# Patient Record
Sex: FEMALE | Race: WHITE | NOT HISPANIC OR LATINO | URBAN - METROPOLITAN AREA
[De-identification: names, ages, dates, MRNs, and addresses within clinical notes are randomized per-mention and may not be internally consistent; named-entity substitution may affect disease eponyms.]

---

## 2017-07-31 ENCOUNTER — INPATIENT (INPATIENT)
Facility: HOSPITAL | Age: 82
LOS: 0 days | Discharge: ANOTHER HOSPITAL | DRG: 85 | End: 2017-08-01
Attending: HOSPITALIST | Admitting: HOSPITALIST
Payer: COMMERCIAL

## 2017-07-31 VITALS
HEIGHT: 62 IN | OXYGEN SATURATION: 99 % | WEIGHT: 139.99 LBS | DIASTOLIC BLOOD PRESSURE: 78 MMHG | HEART RATE: 88 BPM | SYSTOLIC BLOOD PRESSURE: 150 MMHG | TEMPERATURE: 99 F | RESPIRATION RATE: 16 BRPM

## 2017-07-31 DIAGNOSIS — S06.1X0A TRAUMATIC CEREBRAL EDEMA WITHOUT LOSS OF CONSCIOUSNESS, INITIAL ENCOUNTER: ICD-10-CM

## 2017-07-31 DIAGNOSIS — I60.9 NONTRAUMATIC SUBARACHNOID HEMORRHAGE, UNSPECIFIED: ICD-10-CM

## 2017-07-31 LAB
ALBUMIN SERPL ELPH-MCNC: 3.7 G/DL — SIGNIFICANT CHANGE UP (ref 3.5–5)
ALP SERPL-CCNC: 96 U/L — SIGNIFICANT CHANGE UP (ref 40–120)
ALT FLD-CCNC: 25 U/L DA — SIGNIFICANT CHANGE UP (ref 10–60)
ANION GAP SERPL CALC-SCNC: 9 MMOL/L — SIGNIFICANT CHANGE UP (ref 5–17)
APTT BLD: 26.2 SEC — LOW (ref 27.5–37.4)
AST SERPL-CCNC: 39 U/L — SIGNIFICANT CHANGE UP (ref 10–40)
BASOPHILS # BLD AUTO: 0 K/UL — SIGNIFICANT CHANGE UP (ref 0–0.2)
BASOPHILS NFR BLD AUTO: 0.2 % — SIGNIFICANT CHANGE UP (ref 0–2)
BILIRUB SERPL-MCNC: 0.7 MG/DL — SIGNIFICANT CHANGE UP (ref 0.2–1.2)
BUN SERPL-MCNC: 21 MG/DL — HIGH (ref 7–18)
CALCIUM SERPL-MCNC: 9.1 MG/DL — SIGNIFICANT CHANGE UP (ref 8.4–10.5)
CHLORIDE SERPL-SCNC: 104 MMOL/L — SIGNIFICANT CHANGE UP (ref 96–108)
CO2 SERPL-SCNC: 25 MMOL/L — SIGNIFICANT CHANGE UP (ref 22–31)
CREAT SERPL-MCNC: 0.69 MG/DL — SIGNIFICANT CHANGE UP (ref 0.5–1.3)
EOSINOPHIL # BLD AUTO: 0 K/UL — SIGNIFICANT CHANGE UP (ref 0–0.5)
EOSINOPHIL NFR BLD AUTO: 0.1 % — SIGNIFICANT CHANGE UP (ref 0–6)
GLUCOSE SERPL-MCNC: 134 MG/DL — HIGH (ref 70–99)
HCT VFR BLD CALC: 40.8 % — SIGNIFICANT CHANGE UP (ref 34.5–45)
HGB BLD-MCNC: 13.8 G/DL — SIGNIFICANT CHANGE UP (ref 11.5–15.5)
INR BLD: 1.04 RATIO — SIGNIFICANT CHANGE UP (ref 0.88–1.16)
LYMPHOCYTES # BLD AUTO: 0.8 K/UL — LOW (ref 1–3.3)
LYMPHOCYTES # BLD AUTO: 4.8 % — LOW (ref 13–44)
MCHC RBC-ENTMCNC: 33.8 PG — SIGNIFICANT CHANGE UP (ref 27–34)
MCHC RBC-ENTMCNC: 33.9 GM/DL — SIGNIFICANT CHANGE UP (ref 32–36)
MCV RBC AUTO: 99.8 FL — SIGNIFICANT CHANGE UP (ref 80–100)
MONOCYTES # BLD AUTO: 0.5 K/UL — SIGNIFICANT CHANGE UP (ref 0–0.9)
MONOCYTES NFR BLD AUTO: 3.2 % — SIGNIFICANT CHANGE UP (ref 2–14)
NEUTROPHILS # BLD AUTO: 15.3 K/UL — HIGH (ref 1.8–7.4)
NEUTROPHILS NFR BLD AUTO: 91.7 % — HIGH (ref 43–77)
PLATELET # BLD AUTO: 267 K/UL — SIGNIFICANT CHANGE UP (ref 150–400)
POTASSIUM SERPL-MCNC: 4.1 MMOL/L — SIGNIFICANT CHANGE UP (ref 3.5–5.3)
POTASSIUM SERPL-SCNC: 4.1 MMOL/L — SIGNIFICANT CHANGE UP (ref 3.5–5.3)
PROT SERPL-MCNC: 7.7 G/DL — SIGNIFICANT CHANGE UP (ref 6–8.3)
PROTHROM AB SERPL-ACNC: 11.3 SEC — SIGNIFICANT CHANGE UP (ref 9.8–12.7)
RBC # BLD: 4.09 M/UL — SIGNIFICANT CHANGE UP (ref 3.8–5.2)
RBC # FLD: 12 % — SIGNIFICANT CHANGE UP (ref 10.3–14.5)
SODIUM SERPL-SCNC: 138 MMOL/L — SIGNIFICANT CHANGE UP (ref 135–145)
WBC # BLD: 16.7 K/UL — HIGH (ref 3.8–10.5)
WBC # FLD AUTO: 16.7 K/UL — HIGH (ref 3.8–10.5)

## 2017-07-31 PROCEDURE — 71010: CPT | Mod: 26

## 2017-07-31 PROCEDURE — 72125 CT NECK SPINE W/O DYE: CPT | Mod: 26

## 2017-07-31 PROCEDURE — 70450 CT HEAD/BRAIN W/O DYE: CPT | Mod: 26

## 2017-07-31 PROCEDURE — 12001 RPR S/N/AX/GEN/TRNK 2.5CM/<: CPT

## 2017-07-31 PROCEDURE — 99291 CRITICAL CARE FIRST HOUR: CPT | Mod: 25

## 2017-07-31 RX ORDER — ONDANSETRON 8 MG/1
4 TABLET, FILM COATED ORAL ONCE
Qty: 0 | Refills: 0 | Status: COMPLETED | OUTPATIENT
Start: 2017-07-31 | End: 2017-07-31

## 2017-07-31 RX ADMIN — ONDANSETRON 4 MILLIGRAM(S): 8 TABLET, FILM COATED ORAL at 21:25

## 2017-07-31 NOTE — ED ADULT NURSE NOTE - ED STAT RN HANDOFF DETAILS
Endorsed to FREDDIE Chow, remains alert, responsive, resting in stretcher, moving all extremities. Awaiting inpatient bed. Endorsed to FREDDIE Castillo, remains alert, responsive, resting in stretcher, moving all extremities. Awaiting inpatient bed.

## 2017-07-31 NOTE — ED PROVIDER NOTE - OBJECTIVE STATEMENT
89 y/o F pt with a PMHx of HTN (on Lisinopril) and no significant PSHx presents to ED c/o laceration to the back of her head s/p fall x today. Pt states that she lost balance and fell backwards; pt hit the back of her head and noticed bleeding. Pt reports that she feels fine, but her building made her come to the ED for evaluation. Pt denies LOC, fever or any other complaints. Non-smoker. Allergies: Sulfacetamide Sodium.

## 2017-07-31 NOTE — ED PROVIDER NOTE - CRITICAL CARE PROVIDED
consultation with other physicians/interpretation of diagnostic studies/additional history taking/consult w/ pt's family directly relating to pts condition/direct patient care (not related to procedure)

## 2017-07-31 NOTE — ED PROVIDER NOTE - MEDICAL DECISION MAKING DETAILS
89 y/o F pt presents to ED with a linear laceration to the occiput s/p mechanical fall. Will order CT head to r/o intercranial hemorrhage versus vertical spine fracture. Will also clean wound, staple and instruct pt to f/u for staple removal. 89 y/o F pt presents to ED with a linear laceration to the occiput s/p mechanical fall. Will order CT head to r/o intercranial hemorrhage versus vertical spine fracture. Will also clean wound, staple and instruct pt to f/u for staple removal. D/W Neurosurg recommendations above, pt gcs 15, well appearing, no indication for transfer

## 2017-07-31 NOTE — ED ADULT NURSE NOTE - OBJECTIVE STATEMENT
Patient presents to  ED with laceration to back of head s/p fall x today in her laundry room. Patient denies loss of consciousness, headache, dizziness, nausea, vomiting. Breathing easy and unlabored, speaking in full sentences, no use of accessory muscles. Daughter at bedside. Laceration to back of head, bleeding controlled at this time. f

## 2017-08-01 ENCOUNTER — INPATIENT (INPATIENT)
Facility: HOSPITAL | Age: 82
LOS: 1 days | Discharge: HOME | DRG: 85 | End: 2017-08-03
Attending: NEUROLOGICAL SURGERY | Admitting: NEUROLOGICAL SURGERY
Payer: MEDICARE

## 2017-08-01 VITALS
DIASTOLIC BLOOD PRESSURE: 62 MMHG | OXYGEN SATURATION: 96 % | RESPIRATION RATE: 16 BRPM | SYSTOLIC BLOOD PRESSURE: 126 MMHG | HEART RATE: 50 BPM

## 2017-08-01 VITALS
RESPIRATION RATE: 16 BRPM | TEMPERATURE: 98 F | SYSTOLIC BLOOD PRESSURE: 118 MMHG | DIASTOLIC BLOOD PRESSURE: 55 MMHG | OXYGEN SATURATION: 97 % | HEART RATE: 57 BPM

## 2017-08-01 DIAGNOSIS — I60.9 NONTRAUMATIC SUBARACHNOID HEMORRHAGE, UNSPECIFIED: ICD-10-CM

## 2017-08-01 DIAGNOSIS — G93.6 CEREBRAL EDEMA: ICD-10-CM

## 2017-08-01 DIAGNOSIS — Z29.9 ENCOUNTER FOR PROPHYLACTIC MEASURES, UNSPECIFIED: ICD-10-CM

## 2017-08-01 DIAGNOSIS — I62.00 NONTRAUMATIC SUBDURAL HEMORRHAGE, UNSPECIFIED: ICD-10-CM

## 2017-08-01 DIAGNOSIS — I10 ESSENTIAL (PRIMARY) HYPERTENSION: ICD-10-CM
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
## 2017-08-01 LAB
24R-OH-CALCIDIOL SERPL-MCNC: 57.4 NG/ML — SIGNIFICANT CHANGE UP (ref 30–100)
ANION GAP SERPL CALC-SCNC: 10 MMOL/L — SIGNIFICANT CHANGE UP (ref 5–17)
APPEARANCE UR: CLEAR — SIGNIFICANT CHANGE UP
BILIRUB UR-MCNC: NEGATIVE — SIGNIFICANT CHANGE UP
BUN SERPL-MCNC: 20 MG/DL — HIGH (ref 7–18)
CALCIUM SERPL-MCNC: 8.8 MG/DL — SIGNIFICANT CHANGE UP (ref 8.4–10.5)
CHLORIDE SERPL-SCNC: 105 MMOL/L — SIGNIFICANT CHANGE UP (ref 96–108)
CHOLEST SERPL-MCNC: 183 MG/DL — SIGNIFICANT CHANGE UP (ref 10–199)
CO2 SERPL-SCNC: 25 MMOL/L — SIGNIFICANT CHANGE UP (ref 22–31)
COLOR SPEC: YELLOW — SIGNIFICANT CHANGE UP
CREAT SERPL-MCNC: 0.65 MG/DL — SIGNIFICANT CHANGE UP (ref 0.5–1.3)
DIFF PNL FLD: ABNORMAL
FOLATE SERPL-MCNC: >20 NG/ML — SIGNIFICANT CHANGE UP (ref 4.8–24.2)
GLUCOSE SERPL-MCNC: 132 MG/DL — HIGH (ref 70–99)
GLUCOSE UR QL: NEGATIVE — SIGNIFICANT CHANGE UP
HBA1C BLD-MCNC: 5.9 % — HIGH (ref 4–5.6)
HCT VFR BLD CALC: 37.2 % — SIGNIFICANT CHANGE UP (ref 34.5–45)
HDLC SERPL-MCNC: 65 MG/DL — SIGNIFICANT CHANGE UP (ref 40–125)
HGB BLD-MCNC: 12.9 G/DL — SIGNIFICANT CHANGE UP (ref 11.5–15.5)
KETONES UR-MCNC: ABNORMAL
LEUKOCYTE ESTERASE UR-ACNC: ABNORMAL
LIPID PNL WITH DIRECT LDL SERPL: 107 MG/DL — SIGNIFICANT CHANGE UP
MAGNESIUM SERPL-MCNC: 2 MG/DL — SIGNIFICANT CHANGE UP (ref 1.6–2.6)
MCHC RBC-ENTMCNC: 34.5 PG — HIGH (ref 27–34)
MCHC RBC-ENTMCNC: 34.8 GM/DL — SIGNIFICANT CHANGE UP (ref 32–36)
MCV RBC AUTO: 99.2 FL — SIGNIFICANT CHANGE UP (ref 80–100)
NITRITE UR-MCNC: NEGATIVE — SIGNIFICANT CHANGE UP
PH UR: 6.5 — SIGNIFICANT CHANGE UP (ref 5–8)
PHOSPHATE SERPL-MCNC: 3.3 MG/DL — SIGNIFICANT CHANGE UP (ref 2.5–4.5)
PLATELET # BLD AUTO: 241 K/UL — SIGNIFICANT CHANGE UP (ref 150–400)
POTASSIUM SERPL-MCNC: 4.3 MMOL/L — SIGNIFICANT CHANGE UP (ref 3.5–5.3)
POTASSIUM SERPL-SCNC: 4.3 MMOL/L — SIGNIFICANT CHANGE UP (ref 3.5–5.3)
PROT UR-MCNC: 15
RBC # BLD: 3.75 M/UL — LOW (ref 3.8–5.2)
RBC # FLD: 12 % — SIGNIFICANT CHANGE UP (ref 10.3–14.5)
SODIUM SERPL-SCNC: 140 MMOL/L — SIGNIFICANT CHANGE UP (ref 135–145)
SP GR SPEC: 1.02 — SIGNIFICANT CHANGE UP (ref 1.01–1.02)
TOTAL CHOLESTEROL/HDL RATIO MEASUREMENT: 2.8 RATIO — LOW (ref 3.3–7.1)
TRIGL SERPL-MCNC: 57 MG/DL — SIGNIFICANT CHANGE UP (ref 10–149)
TSH SERPL-MCNC: 0.38 UU/ML — SIGNIFICANT CHANGE UP (ref 0.34–4.82)
UROBILINOGEN FLD QL: NEGATIVE — SIGNIFICANT CHANGE UP
VIT B12 SERPL-MCNC: 622 PG/ML — SIGNIFICANT CHANGE UP (ref 243–894)
WBC # BLD: 11.9 K/UL — HIGH (ref 3.8–10.5)
WBC # FLD AUTO: 11.9 K/UL — HIGH (ref 3.8–10.5)

## 2017-08-01 PROCEDURE — 82607 VITAMIN B-12: CPT

## 2017-08-01 PROCEDURE — 80053 COMPREHEN METABOLIC PANEL: CPT

## 2017-08-01 PROCEDURE — 80048 BASIC METABOLIC PNL TOTAL CA: CPT

## 2017-08-01 PROCEDURE — 85730 THROMBOPLASTIN TIME PARTIAL: CPT

## 2017-08-01 PROCEDURE — 82553 CREATINE MB FRACTION: CPT

## 2017-08-01 PROCEDURE — 71045 X-RAY EXAM CHEST 1 VIEW: CPT

## 2017-08-01 PROCEDURE — 84484 ASSAY OF TROPONIN QUANT: CPT

## 2017-08-01 PROCEDURE — 86901 BLOOD TYPING SEROLOGIC RH(D): CPT

## 2017-08-01 PROCEDURE — 85027 COMPLETE CBC AUTOMATED: CPT

## 2017-08-01 PROCEDURE — 86900 BLOOD TYPING SEROLOGIC ABO: CPT

## 2017-08-01 PROCEDURE — 99285 EMERGENCY DEPT VISIT HI MDM: CPT | Mod: 25,GC

## 2017-08-01 PROCEDURE — 70450 CT HEAD/BRAIN W/O DYE: CPT | Mod: 26

## 2017-08-01 PROCEDURE — 93010 ELECTROCARDIOGRAM REPORT: CPT

## 2017-08-01 PROCEDURE — 83036 HEMOGLOBIN GLYCOSYLATED A1C: CPT

## 2017-08-01 PROCEDURE — 81001 URINALYSIS AUTO W/SCOPE: CPT

## 2017-08-01 PROCEDURE — 99233 SBSQ HOSP IP/OBS HIGH 50: CPT

## 2017-08-01 PROCEDURE — 73030 X-RAY EXAM OF SHOULDER: CPT

## 2017-08-01 PROCEDURE — 82550 ASSAY OF CK (CPK): CPT

## 2017-08-01 PROCEDURE — 87186 SC STD MICRODIL/AGAR DIL: CPT

## 2017-08-01 PROCEDURE — 99221 1ST HOSP IP/OBS SF/LOW 40: CPT

## 2017-08-01 PROCEDURE — 72125 CT NECK SPINE W/O DYE: CPT

## 2017-08-01 PROCEDURE — 70450 CT HEAD/BRAIN W/O DYE: CPT

## 2017-08-01 PROCEDURE — 70450 CT HEAD/BRAIN W/O DYE: CPT | Mod: 26,77

## 2017-08-01 PROCEDURE — 99223 1ST HOSP IP/OBS HIGH 75: CPT | Mod: AI,GC

## 2017-08-01 PROCEDURE — 87086 URINE CULTURE/COLONY COUNT: CPT

## 2017-08-01 PROCEDURE — 73030 X-RAY EXAM OF SHOULDER: CPT | Mod: 26,50

## 2017-08-01 PROCEDURE — 12001 RPR S/N/AX/GEN/TRNK 2.5CM/<: CPT

## 2017-08-01 PROCEDURE — 93005 ELECTROCARDIOGRAM TRACING: CPT

## 2017-08-01 PROCEDURE — 82306 VITAMIN D 25 HYDROXY: CPT

## 2017-08-01 PROCEDURE — 84100 ASSAY OF PHOSPHORUS: CPT

## 2017-08-01 PROCEDURE — 86850 RBC ANTIBODY SCREEN: CPT

## 2017-08-01 PROCEDURE — 84443 ASSAY THYROID STIM HORMONE: CPT

## 2017-08-01 PROCEDURE — 83735 ASSAY OF MAGNESIUM: CPT

## 2017-08-01 PROCEDURE — 80061 LIPID PANEL: CPT

## 2017-08-01 PROCEDURE — 99291 CRITICAL CARE FIRST HOUR: CPT | Mod: 25

## 2017-08-01 PROCEDURE — 85610 PROTHROMBIN TIME: CPT

## 2017-08-01 PROCEDURE — 82746 ASSAY OF FOLIC ACID SERUM: CPT

## 2017-08-01 RX ORDER — DEXAMETHASONE 0.5 MG/5ML
10 ELIXIR ORAL ONCE
Qty: 0 | Refills: 0 | Status: COMPLETED | OUTPATIENT
Start: 2017-08-01 | End: 2017-08-01

## 2017-08-01 RX ORDER — LISINOPRIL 2.5 MG/1
1 TABLET ORAL
Qty: 0 | Refills: 0 | COMMUNITY
Start: 2017-08-01

## 2017-08-01 RX ORDER — TETANUS TOXOID, REDUCED DIPHTHERIA TOXOID AND ACELLULAR PERTUSSIS VACCINE, ADSORBED 5; 2.5; 8; 8; 2.5 [IU]/.5ML; [IU]/.5ML; UG/.5ML; UG/.5ML; UG/.5ML
0.5 SUSPENSION INTRAMUSCULAR ONCE
Qty: 0 | Refills: 0 | Status: COMPLETED | OUTPATIENT
Start: 2017-08-01 | End: 2017-08-01

## 2017-08-01 RX ORDER — ACETAMINOPHEN 500 MG
2 TABLET ORAL
Qty: 0 | Refills: 0 | COMMUNITY
Start: 2017-08-01

## 2017-08-01 RX ORDER — ACETAMINOPHEN 500 MG
0 TABLET ORAL
Qty: 0 | Refills: 0 | COMMUNITY

## 2017-08-01 RX ORDER — ATORVASTATIN CALCIUM 80 MG/1
1 TABLET, FILM COATED ORAL
Qty: 0 | Refills: 0 | COMMUNITY
Start: 2017-08-01

## 2017-08-01 RX ORDER — ATORVASTATIN CALCIUM 80 MG/1
20 TABLET, FILM COATED ORAL AT BEDTIME
Qty: 0 | Refills: 0 | Status: DISCONTINUED | OUTPATIENT
Start: 2017-08-01 | End: 2017-08-01

## 2017-08-01 RX ORDER — LISINOPRIL 2.5 MG/1
10 TABLET ORAL DAILY
Qty: 0 | Refills: 0 | Status: DISCONTINUED | OUTPATIENT
Start: 2017-08-01 | End: 2017-08-01

## 2017-08-01 RX ORDER — DEXAMETHASONE 0.5 MG/5ML
0 ELIXIR ORAL
Qty: 0 | Refills: 0 | COMMUNITY
Start: 2017-08-01

## 2017-08-01 RX ORDER — ACETAMINOPHEN 500 MG
650 TABLET ORAL EVERY 6 HOURS
Qty: 0 | Refills: 0 | Status: DISCONTINUED | OUTPATIENT
Start: 2017-08-01 | End: 2017-08-01

## 2017-08-01 RX ORDER — DEXAMETHASONE 0.5 MG/5ML
10 ELIXIR ORAL ONCE
Qty: 0 | Refills: 0 | Status: DISCONTINUED | OUTPATIENT
Start: 2017-08-01 | End: 2017-08-01

## 2017-08-01 RX ORDER — LISINOPRIL 2.5 MG/1
1 TABLET ORAL
Qty: 0 | Refills: 0 | COMMUNITY

## 2017-08-01 RX ORDER — LEVETIRACETAM 250 MG/1
500 TABLET, FILM COATED ORAL
Qty: 0 | Refills: 0 | Status: DISCONTINUED | OUTPATIENT
Start: 2017-08-01 | End: 2017-08-02

## 2017-08-01 RX ORDER — SODIUM CHLORIDE 9 MG/ML
1000 INJECTION INTRAMUSCULAR; INTRAVENOUS; SUBCUTANEOUS
Qty: 0 | Refills: 0 | Status: DISCONTINUED | OUTPATIENT
Start: 2017-08-01 | End: 2017-08-02

## 2017-08-01 RX ADMIN — SODIUM CHLORIDE 100 MILLILITER(S): 9 INJECTION INTRAMUSCULAR; INTRAVENOUS; SUBCUTANEOUS at 16:01

## 2017-08-01 RX ADMIN — Medication 102 MILLIGRAM(S): at 11:49

## 2017-08-01 RX ADMIN — LEVETIRACETAM 500 MILLIGRAM(S): 250 TABLET, FILM COATED ORAL at 20:08

## 2017-08-01 RX ADMIN — TETANUS TOXOID, REDUCED DIPHTHERIA TOXOID AND ACELLULAR PERTUSSIS VACCINE, ADSORBED 0.5 MILLILITER(S): 5; 2.5; 8; 8; 2.5 SUSPENSION INTRAMUSCULAR at 14:38

## 2017-08-01 RX ADMIN — SODIUM CHLORIDE 100 MILLILITER(S): 9 INJECTION INTRAMUSCULAR; INTRAVENOUS; SUBCUTANEOUS at 20:08

## 2017-08-01 NOTE — ED PROVIDER NOTE - NS ED ROS FT
ROS: GENERAL: no fevers, no chills HEENT: no epistaxis, no eye pain, no ear pain, no throat pain CARDIAC: no chest pain, no shortness of breath PULM: no cough, no shortness of breath GI: no nausea, no vomiting, no diarrhea, no abdominal pain, no hematemesis, no bright red blood per rectum : no dysuria, no hematuria EXTREMITIES: no arm pain, no leg pain, no back pain SKIN: no purpura, no petechiae, + head laceration NEURO: no headache, no neck pain, no loss of strength/sensation HEME: no easy bruising, no easy bleeding

## 2017-08-01 NOTE — CONSULT NOTE ADULT - SUBJECTIVE AND OBJECTIVE BOX
CC:    HPI: HPI:  89 y/o F from home, lives alone, NO HHA, ambulates with the help of cane, PMH of arthritis, HTN, no significant PSHx presents to ED with f/o fall leading to head injury this afternoon. patient was at her baseline health and was on her way to laundry in her building she was coming down the ramp, when all of a sudden her legs gave away, she lost her balance, and fell on her head backwards. patient hit her head and noticed bleeding, then she woke up and went to [facility], doorman noticed that she was bleeding and called 911. patient denies any chest pain, dizziness, palpitations, nasuea, vomiting LOC before or after the event, remember entire event. patient had 1 episode of fall in the past. Patient denies any headache, vomiting, visual disturbances, weakness, numbness, tingling of extremities, difficulty swallowing or difficulty breathing, pain in extremities or restricted movement of nay joint, able to ambulate well just like before.  In ED, patient vitals were stable on admission, s/p 2 staples for laceration over occipital portion of scalp, CT head and cervical spine s/o Small acute bilateral frontal subdural hematomas and acute subarachnoid hemorrhages. No acute fracture cervical spine. ED physician discusssed with neurosurgery , since patient's GCS is 15, no indication for transfer, to get urgent CT head  if change in mental status, EKG_ NSR@71 BPM, right ventricular conduction delay, no ST T wave changes  will admit to telemetry for management of Small acute bilateral frontal subdural hematomas and acute subarachnoid hemorrhages, impending worsening of neuro function. (01 Aug 2017 01:35)      ROS:  Constitutional, Neurological, Psychiatric, Eyes, ENT, Cardiovascular, Respiratory, Gastrointestinal, Genitourinary, Musculoskeletal, Integumentary, Endocrine and Heme/Lymph are otherwise negative. Except for HPI    Vital Signs Last 24 Hrs  T(C): 36.8 (01 Aug 2017 07:40), Max: 37.1 (31 Jul 2017 16:53)  T(F): 98.2 (01 Aug 2017 07:40), Max: 98.8 (31 Jul 2017 23:23)  HR: 88 (01 Aug 2017 07:40) (64 - 88)  BP: 105/70 (01 Aug 2017 07:40) (105/70 - 150/78)  BP(mean): --  RR: 17 (01 Aug 2017 07:40) (16 - 17)  SpO2: 98% (01 Aug 2017 07:40) (98% - 99%)    Physical Exam:  Constitutional: alert and in no acute distress.  Eyes: the sclera and conjunctiva were normal, pupils were equal in size, round, reactive to light, with normal accommodation and extraocular movements were intact.   Back: no costovertebral angle tenderness and no spinal tenderness.      Neuro Exam: (pending)  Orientation: oriented to person, oriented to place and oriented to time.   Attention: normal concentrating ability and visual attention was not decreased.   Language: no difficulty naming common objects, no difficulty repeating a phrase, no difficulty writing a sentence, fluency intact, comprehension intact and reading intact.   Fund of knowledge: displays adequate knowledge of personal past history.   Cranial Nerves: visual acuity intact bilaterally, visual fields full to confrontation, pupils equal round and reactive to light, extraocular motion intact, facial sensation intact symmetrically, face symmetrical, hearing was intact bilaterally, tongue and palate midline, head turning and shoulder shrug symmetric and there was no tongue deviation with protrusion.   Motor: muscle tone was normal in all four extremities, muscle strength was normal in all four extremities and normal bulk in all four extremities.   Sensory exam: light touch was intact.   Coordination:. normal gait. balance was intact. there was no past-pointing. no tremor present.   Deep tendon reflexes:   Biceps right 2+. Biceps left 2+.    Triceps right 2+. Triceps left 2+.  LOC  Brachioradialis right 2+. Brachioradialis left 2+.    Patella right 2+. Patella left 2+.    Ankle jerk right 2+. Ankle jerk left 2+.   Plantar responses normal on the right, normal on the left.        Allergies    Sulfacetamide Sodium (Unknown)    Intolerances      MEDICATIONS  (STANDING):  lisinopril 10 milliGRAM(s) Oral daily  atorvastatin 20 milliGRAM(s) Oral at bedtime    MEDICATIONS  (PRN):  acetaminophen   Tablet. 650 milliGRAM(s) Oral every 6 hours PRN Severe Pain (7 - 10)      LABS:                        12.9   11.9  )-----------( 241      ( 01 Aug 2017 05:38 )             37.2     08-01    140  |  105  |  20<H>  ----------------------------<  132<H>  4.3   |  25  |  0.65    Ca    8.8      01 Aug 2017 05:38  Phos  3.3     08-01  Mg     2.0     08-01    TPro  7.7  /  Alb  3.7  /  TBili  0.7  /  DBili  x   /  AST  39  /  ALT  25  /  AlkPhos  96  07-31    PT/INR - ( 31 Jul 2017 20:40 )   PT: 11.3 sec;   INR: 1.04 ratio         PTT - ( 31 Jul 2017 20:40 )  PTT:26.2 sec        Neuro Imaging: reviewed CC:    HPI: HPI:  89 y/o F from home, lives alone, NO HHA, ambulates with the help of cane, PMH of arthritis, HTN, no significant PSHx presents to ED with f/o fall leading to head injury this afternoon. patient was at her baseline health and was on her way to laundry in her building she was coming down the ramp, when all of a sudden her legs gave away, she lost her balance, and fell on her head backwards. patient hit her head and noticed bleeding, then she woke up and went to [facility], doorman noticed that she was bleeding and called 911. patient denies any chest pain, dizziness, palpitations, nasuea, vomiting LOC before or after the event, remember entire event. patient had 1 episode of fall in the past. Patient denies any headache, vomiting, visual disturbances, weakness, numbness, tingling of extremities, difficulty swallowing or difficulty breathing, pain in extremities or restricted movement of nay joint, able to ambulate well just like before.  In ED, patient vitals were stable on admission, s/p 2 staples for laceration over occipital portion of scalp, CT head and cervical spine s/o Small acute bilateral frontal subdural hematomas and acute subarachnoid hemorrhages. No acute fracture cervical spine. ED physician discusssed with neurosurgery , since patient's GCS is 15, no indication for transfer, to get urgent CT head  if change in mental status, EKG_ NSR@71 BPM, right ventricular conduction delay, no ST T wave changes  will admit to telemetry for management of Small acute bilateral frontal subdural hematomas and acute subarachnoid hemorrhages, impending worsening of neuro function. (01 Aug 2017 01:35)      ROS:  Constitutional, Neurological, Psychiatric, Eyes, ENT, Cardiovascular, Respiratory, Gastrointestinal, Genitourinary, Musculoskeletal, Integumentary, Endocrine and Heme/Lymph are otherwise negative. Except for HPI    Vital Signs Last 24 Hrs  T(C): 36.8 (01 Aug 2017 07:40), Max: 37.1 (31 Jul 2017 16:53)  T(F): 98.2 (01 Aug 2017 07:40), Max: 98.8 (31 Jul 2017 23:23)  HR: 88 (01 Aug 2017 07:40) (64 - 88)  BP: 105/70 (01 Aug 2017 07:40) (105/70 - 150/78)  BP(mean): --  RR: 17 (01 Aug 2017 07:40) (16 - 17)  SpO2: 98% (01 Aug 2017 07:40) (98% - 99%)    Physical Exam:  Constitutional: alert and in no acute distress.  Eyes: the sclera and conjunctiva were normal, pupils were equal in size, round, reactive to light, with normal accommodation and extraocular movements were intact.   Back: no costovertebral angle tenderness and no spinal tenderness.      Neuro Exam:  deferred    Allergies    Sulfacetamide Sodium (Unknown)    Intolerances      MEDICATIONS  (STANDING):  lisinopril 10 milliGRAM(s) Oral daily  atorvastatin 20 milliGRAM(s) Oral at bedtime    MEDICATIONS  (PRN):  acetaminophen   Tablet. 650 milliGRAM(s) Oral every 6 hours PRN Severe Pain (7 - 10)      LABS:                        12.9   11.9  )-----------( 241      ( 01 Aug 2017 05:38 )             37.2     08-01    140  |  105  |  20<H>  ----------------------------<  132<H>  4.3   |  25  |  0.65    Ca    8.8      01 Aug 2017 05:38  Phos  3.3     08-01  Mg     2.0     08-01    TPro  7.7  /  Alb  3.7  /  TBili  0.7  /  DBili  x   /  AST  39  /  ALT  25  /  AlkPhos  96  07-31    PT/INR - ( 31 Jul 2017 20:40 )   PT: 11.3 sec;   INR: 1.04 ratio         PTT - ( 31 Jul 2017 20:40 )  PTT:26.2 sec        Neuro Imaging: reviewed

## 2017-08-01 NOTE — H&P ADULT - ASSESSMENT
Yolanda Castellanos, 89 year old female with history of HTN transferred here from [facility] after suffering a mechanical fall with relatively stable tSAH/SDH, increased IPH.    -admit to neurosurgery  -repeat cth pending  -[id number]  -keppra 500 bid

## 2017-08-01 NOTE — DISCHARGE NOTE ADULT - PATIENT PORTAL LINK FT
“You can access the [facility] Patient Portal, offered by [facility], by registering with the following website: [email or web address]”

## 2017-08-01 NOTE — H&P ADULT - HISTORY OF PRESENT ILLNESS
89 y/o F from home, lives alone, NO HHA, ambulates with the help of cane, PMH of arthritis, HTN, no significant PSHx presents to ED with f/o fall leading to head injury this afternoon. patient was at her baseline health and was on her way to laundry in her building she was coming down the ramp, when all of a sudden her legs gave away, she lost her balance, and fell on her head backwards. patient hit her head and noticed bleeding, then she woke up and went to [facility], isl noticed that she was bleeding and called 911. patient denies any chest pain, dizziness, palpitations, nasuea, vomiting LOC before or after the event, remember entire event. patient had 1 episode of fall in the past. Patient denies any headache, vomiting, visual disturbances, weakness, numbness, tingling of extremities, difficulty swallowing or difficulty breathing, pain in extremities or restricted movement of nay joint, able to ambulate well just like before.  In ED, patient vitals were stable on admission, s/p 2 staples for laceration over occipital portion of scalp, CT head and cervical spine s/o Small acute bilateral frontal subdural hematomas and acute subarachnoid hemorrhages. No acute fracture cervical spine. ED physician discusssed with neurosurgery , since patient's GCS is 15, no indication for transfer, to get urgent CT head  if change in mental status.  will admit to telemetry for management of Small acute bilateral frontal subdural hematomas and acute subarachnoid hemorrhages, impending worsening of neuro function. 89 y/o F from home, lives alone, NO HHA, ambulates with the help of cane, PMH of arthritis, HTN, no significant PSHx presents to ED with f/o fall leading to head injury this afternoon. patient was at her baseline health and was on her way to laundry in her building she was coming down the ramp, when all of a sudden her legs gave away, she lost her balance, and fell on her head backwards. patient hit her head and noticed bleeding, then she woke up and went to [facility], sil noticed that she was bleeding and called 911. patient denies any chest pain, dizziness, palpitations, nasuea, vomiting LOC before or after the event, remember entire event. patient had 1 episode of fall in the past. Patient denies any headache, vomiting, visual disturbances, weakness, numbness, tingling of extremities, difficulty swallowing or difficulty breathing, pain in extremities or restricted movement of nay joint, able to ambulate well just like before.  In ED, patient vitals were stable on admission, s/p 2 staples for laceration over occipital portion of scalp, CT head and cervical spine s/o Small acute bilateral frontal subdural hematomas and acute subarachnoid hemorrhages. No acute fracture cervical spine. ED physician discusssed with neurosurgery , since patient's GCS is 15, no indication for transfer, to get urgent CT head  if change in mental status, EKG_ NSR@71 BPM, right ventricular conduction delay, no ST T wave changes  will admit to telemetry for management of Small acute bilateral frontal subdural hematomas and acute subarachnoid hemorrhages, impending worsening of neuro function.

## 2017-08-01 NOTE — H&P ADULT - NEUROLOGICAL DETAILS
deep reflexes intact/normal strength/sensation intact/superficial reflexes intact/cranial nerves intact/alert and oriented x 3

## 2017-08-01 NOTE — H&P ADULT - RS GEN PE MLT RESP DETAILS PC
clear to auscultation bilaterally/breath sounds equal/respirations non-labored/no chest wall tenderness/airway patent/good air movement

## 2017-08-01 NOTE — H&P ADULT - ATTENDING COMMENTS
Pt seen and examined with resident.  As per above H&P.  Pt s/p fall backward with minimal symptoms at this time.  She is neurologically non focal, fully conversant.  HARRIS.  CT showed bifrontal contusions with flourishing of the left frontal contusion on repeat and increase in posterior interhemispheric subdural blood.  Repeat CT after transfer from [facility], by verbal report from radiologist, was stable.  Pt to be admitted for further observation, PT assessment.

## 2017-08-01 NOTE — DISCHARGE NOTE ADULT - PLAN OF CARE
patient will remain symptom free -TRANSFER TO [facility] for neurosurgery evaluation  -maintain BP less than 140/90  -maintain aspiration precautions  -neuro checks per hospital protocol -continue lisinopril maintain fall precautions IMPROVE score 2  -no chemical DVT prophylaxis due to SAH maintain fall precautions    continue statin

## 2017-08-01 NOTE — CONSULT NOTE ADULT - PROBLEM SELECTOR RECOMMENDATION 9
Pt has SAH and SDH sec to Mechanical Fall leading to Head trauma seen on CT head last night showing Small acute bilateral frontal subdural hematomas and acute subarachnoid hemorrhages.   Repeat CT head this AM showed Mild bifrontal acute subarachnoid hemorrhages, grossly unchanged. Mild bifrontal subdural hemorrhages, slightly increased on the right and improved on the left. New mild acute subdural hemorrhages in the bilateral parietal and occipital regions. New left frontal intraparenchymal hemorrhage measuring approximately 3.2 x 3.4 cm with adjacent parenchymal edema.  No acute fracture cervical spine  patient's GCS is 15.  no focal neurological deficit noted.  will get CT head stat in case of neural deficits or change in mental status.  s/p Decadron 10 mg IV once for cerebral edema  neuro check sQ2h  fall precautions  Neuro Dr. Khanna consulted, plan for Transfer to [facility] [phone], discussed with Attending who initiated Transfer but awaiting bed.  Discussed case with pts PMD Pt has SAH and SDH sec to Mechanical Fall leading to Head trauma seen on CT head last night showing Small acute bilateral frontal subdural hematomas and acute subarachnoid hemorrhages.   Repeat CT head this AM showed Mild bifrontal acute subarachnoid hemorrhages, grossly unchanged. Mild bifrontal subdural hemorrhages, slightly increased on the right and improved on the left. New mild acute subdural hemorrhages in the bilateral parietal and occipital regions. New left frontal intraparenchymal hemorrhage measuring approximately 3.2 x 3.4 cm with adjacent parenchymal edema.  No acute fracture cervical spine  patient's GCS is 15.  no focal neurological deficit noted.  will get CT head stat in case of neural deficits or change in mental status.  s/p Decadron 10 mg IV once for cerebral edema  neuro check sQ2h  fall precautions  Neuro Dr. Khanna consulted, plan for Transfer to [facility] [phone] ER, discussed with Attending who initiated Transfer.   Discussed case with pts PMD  Discussed plan with Intensivist

## 2017-08-01 NOTE — DISCHARGE NOTE ADULT - MEDICATION SUMMARY - MEDICATIONS TO TAKE
I will START or STAY ON the medications listed below when I get home from the hospital:    dexamethasone  --     -- Indication: For Subarachnoid bleed    acetaminophen 325 mg oral tablet  -- 2 tab(s) by mouth every 6 hours, As needed, Severe Pain (7 - 10)  -- Indication: For Prophylactic measure    lisinopril 10 mg oral tablet  -- 1 tab(s) by mouth once a day  -- Indication: For HTN (hypertension)    atorvastatin 20 mg oral tablet  -- 1 tab(s) by mouth once a day (at bedtime)  -- Indication: For Prophylactic measure    multivitamin  --     -- Indication: For Prophylactic measure

## 2017-08-01 NOTE — ED ADULT NURSE NOTE - OBJECTIVE STATEMENT
89 y/o F, reported to ED via transfer from [place]. Pt A&Ox3, c/o subarachnoid bleed. Pt was in the laundry room at her apartment building when she fell backwards and hit her head on the wall. Pt has a laceration to the back of her head that was treated with 2 staples. Pt is unsure if she lost consciousness. Pt denies LOC, H/A, C/P, N/V/D, abd pain. Pt denies vision changes. Neuro intact. 89 y/o F, reported to ED via transfer from [place]. Pt A&Ox3, c/o subarachnoid bleed. Pt was in the laundry room at her apartment building when she fell backwards and hit her head on the wall. Pt has a laceration to the back of her head that was treated with 2 staples. Pt is unsure if she lost consciousness. Pt has a 20G in LAC from previous hospital. Pt denies pain or discomfort at this time. Pt denies LOC, H/A, C/P, N/V/D, abd pain. Pt denies vision changes. Neuro intact. PMS intact. Pt denies numbness or tingling. Will continue to monitor pt, daughters at pt's bedside.

## 2017-08-01 NOTE — CONSULT NOTE ADULT - ASSESSMENT
Posttraumatic subdural hematoma, subarachnoid hemorrhage, now with interval expansion to intracerebral left frontal hemorrhage with cerebral edema  maintain blood pressure less than 140/90  Neuro checks  Transferred to medical ICU  Discuss code status   Call Transfer center [phone] Neurosurgery to consider transfer to [facility]  MRI brain with and without contrast Posttraumatic subdural hematoma, subarachnoid hemorrhage, now with interval expansion to intracerebral left frontal hemorrhage with cerebral edema  maintain blood pressure less than 140/90  Neuro checks  Transfer to medical ICU  Discuss code status   Call Transfer center [phone] Neurosurgery to consider transfer to [facility]  MRI brain with and without contrast

## 2017-08-01 NOTE — H&P ADULT - NSHPLABSRESULTS_GEN_ALL_CORE
< from: CT Cervical Spine No Cont (07.31.17 @ 17:44) >    CT CERVICAL SPINE    , CT Head      < end of copied text >    < from: CT Cervical Spine No Cont (07.31.17 @ 17:44) >    IMPRESSION:  Small acute bilateral frontal subdural hematomas and acute subarachnoid   hemorrhages.    No acute fracture cervical spine.    < end of copied text >    _________________________________________________  LABS:                        13.8   16.7  )-----------( 267      ( 31 Jul 2017 20:40 )             40.8     07-31    138  |  104  |  21<H>  ----------------------------<  134<H>  4.1   |  25  |  0.69    Ca    9.1      31 Jul 2017 20:40    TPro  7.7  /  Alb  3.7  /  TBili  0.7  /  DBili  x   /  AST  39  /  ALT  25  /  AlkPhos  96  07-31    PT/INR - ( 31 Jul 2017 20:40 )   PT: 11.3 sec;   INR: 1.04 ratio         PTT - ( 31 Jul 2017 20:40 )  PTT:26.2 sec

## 2017-08-01 NOTE — H&P ADULT - ASSESSMENT
89 y/o F from home, lives alone, NO HHA, ambulates with the help of cane, PMH of arthritis, HTN, no significant PSHx presents to ED with f/o fall leading to head injury this afternoon.    In ED, patient vitals were stable on admission, s/p 2 staples for laceration over occipital portion of scalp, CT head and cervical spine s/o Small acute bilateral frontal subdural hematomas and acute subarachnoid hemorrhages. No acute fracture cervical spine. ED physician discussed with neurosurgery , since patient's GCS is 15, no indication for transfer, to get urgent CT head  if change in mental status.  will admit to telemetry for management of Small acute bilateral frontal subdural hematomas and acute subarachnoid hemorrhages, impending worsening of neuro function.

## 2017-08-01 NOTE — CONSULT NOTE ADULT - SUBJECTIVE AND OBJECTIVE BOX
p ([id number])     HPI: Yolanda Castellanos, 89 year old female with history of HTN [facility] suffering a [facility] fall yesterday late afternoon. Denies any LOC. Patient only on lisinopril, no AC.  Patient ambulates with a walker and goes grocery shopping on her own.  Received 2 sets of CT scans before transfer. 16 hour interval [facility] showed stable mild bifrontal tsah/SDH, new mild SDH  in bilateral parietal and occipital regions, and new left frontal IPH (3.2x3.4).    PAST MEDICAL HISTORY   Hypertension    PAST SURGICAL HISTORY     MEDICATIONS:  Antibiotics:     Neuro:    Anticoagulation: neg    Other:  sodium chloride 0.9%. 1000 milliLiter(s) IV Continuous <Continuous>      SOCIAL HISTORY:   Occupation:   Marital Status:     FAMILY HISTORY:      REVIEW OF SYSTEMS:  Check here if all are normal other than Neurological [x]  General:  Eyes:  ENT:  Cardiac:  Respiratory:  GI:  Musculoskeletal:   Skin:  Neurologic:   Psychiatric:     PHYSICAL EXAMINATION:   T(C): 36.8 (08-01-17 @ 13:43), Max: 36.8 (08-01-17 @ 13:43)  HR: 57 (08-01-17 @ 13:43) (50 - 57)  BP: 108/96 (08-01-17 @ 13:43) (108/96 - 126/62)  RR: 18 (08-01-17 @ 13:43) (16 - 18)  SpO2: 96% (08-01-17 @ 13:43) (96% - 96%)  Wt(kg): --    General Examination:     Neurologic Examination:             Higher functions                 Normal [x]               Abnormal:      Cranial Nerves (ii-xii)           Normal [x]              Abnormal:     Motor Exam                       Normal []              Abnormal: 4+/5 bilateral LE (baseline), otherwise 5/5 throughout                              Sensory Exam                   Normal [x]              Abnormal:    Reflexes                            Normal [x]              Abnormal:     Coordination                      Normal [x]              Abnormal:    Other:     LABS:                RADIOLOGY & ADDITIONAL STUDIES:  per hpi

## 2017-08-01 NOTE — ED PROVIDER NOTE - MEDICAL DECISION MAKING DETAILS
89 y/o F from home, lives alone, NO HHA, ambulates with the help of cane, PMH of arthritis, HTN, no significant PSHx transferred from [facility] to here for Posttraumatic subdural hematoma, subarachnoid hemorrhage yesterday afternoon s/p fall, now with interval expansion to intracerebral left frontal hemorrhage with cerebral edema this morning. Labs WNL from this morning, will get repeat CT head, Nsgy and trauma consult, patient otherwise asymptomatic, admit for further monitoring.

## 2017-08-01 NOTE — ED PROVIDER NOTE - OBJECTIVE STATEMENT
89 y/o F from home, lives alone, NO HHA, ambulates with the help of cane, [place] of arthritis, HTN, no significant PSHx transferred from [facility] to here for Posttraumatic subdural hematoma, subarachnoid hemorrhage, now with interval expansion to intracerebral left frontal hemorrhage with cerebral edema this morning. Yesterday afternoon, had mechanical fall with patient was at her baseline health and was on her way to laundry in her building she was coming down the ramp, when all of a sudden her legs gave away, she lost her balance, and fell on her head backwards. Was convinced to go to ED at [facility] for evaluation. Patient denied any chest pain, dizziness, palpitations, nausea, vomiting LOC before or after the event, remember entire event. patient had 1 episode of fall in the past. Patient denies any headache, vomiting, visual disturbances, weakness, numbness, tingling of extremities, difficulty swallowing or difficulty breathing, pain in extremities or restricted movement of nay joint, able to ambulate well just like before. Observed overnight Given decadron at [facility].    Posttraumatic subdural hematoma, subarachnoid hemorrhage, now with interval expansion to intracerebral left frontal hemorrhage with cerebral edema  maintain blood pressure less than 140/90  Neuro checks  Transferred to medical ICU  Discuss code status   Call Transfer center [phone] Neurosurgery to consider transfer to [facility]  MRI brain with and without contrast 89 y/o F from home, lives alone, NO HHA, ambulates with the help of cane, [facility] of arthritis, HTN, no significant PSHx transferred from [facility] to here for Posttraumatic subdural hematoma, subarachnoid hemorrhage yesterday s/p fall, now with interval expansion to intracerebral left frontal hemorrhage with cerebral edema this morning. Patient asymptomatic at this time, only with laceration on the back of the head s/p two staples. Yesterday afternoon, had mechanical fall with patient was at her baseline health and was on her way to laundry in her building she was coming down the ramp, she lost her balance, and fell on her head backwards. Was convinced from building to go to ED at [facility] for evaluation. No LOC, remembers entire event. Patient had 1 episode of fall in the past without consequence, unknown tetanus vaccine. Denies lightheadedness, fevers, chills, nausea, vomiting, diarrhea, constipation, chest pain, abd, or dyspnea. No motor or sensory changes, no bowel/bladder symptoms. Observed overnight, had interval worsening hemorrhages from initial evaluation. Given 10mg decadron at [facility]. As per neurology recommendations there, maintain blood pressure less than 140/90  Neuro checks  Transferred to medical ICU  Discuss code status   Call Transfer center [phone] Neurosurgery to consider transfer to [facility]  MRI brain with and without contrast 89 y/o F from home, lives alone, NO HHA, ambulates with the help of cane, PMH of arthritis, HTN, no significant PSHx transferred from [facility] to here for Posttraumatic subdural hematoma, subarachnoid hemorrhage yesterday s/p fall, now with interval expansion to intracerebral left frontal hemorrhage with cerebral edema this morning. Patient asymptomatic at this time, only with laceration on the back of the head s/p two staples. Yesterday afternoon, had mechanical fall with patient was at her baseline health and was on her way to laundry in her building she was coming down the ramp, she lost her balance, and fell on her head backwards. Was convinced from building to go to ED at [facility] for evaluation. No LOC, remembers entire event. Patient had 1 episode of fall in the past without consequence, unknown tetanus vaccine. Denies lightheadedness, fevers, chills, nausea, vomiting, diarrhea, constipation, chest pain, abd, or dyspnea. No motor or sensory changes, no bowel/bladder symptoms. Observed overnight, had interval worsening hemorrhages with cerebral edema from initial evaluation. Given 10mg decadron at [facility]. As per neurology recommendations there, maintain blood pressure less than 140/90, c/w Neuro checks, transferred here for neurosurg eval, possible MRI brain with and without contrast.    PCP: Dr. Jon Willis ([phone]) 89 y/o F from home, lives alone, NO HHA, ambulates with the help of cane, PMH of arthritis, HTN, no significant PSHx transferred from [facility] to here for Posttraumatic subdural hematoma, subarachnoid hemorrhage yesterday afternoon s/p fall, now with interval expansion to intracerebral left frontal hemorrhage with cerebral edema this morning. Patient asymptomatic at this time, only with laceration on the back of the head s/p two staples. Yesterday afternoon, had mechanical fall with patient was at her baseline health and was on her way to laundry in her building she was coming down the ramp, she lost her balance, and fell on her head backwards. Was convinced from building to go to ED at [facility] for evaluation. No LOC, remembers entire event. Patient had 1 episode of fall in the past without consequence, unknown tetanus vaccine. Denies lightheadedness, fevers, chills, nausea, vomiting, diarrhea, constipation, chest pain, abd, or dyspnea. No motor or sensory changes, no bowel/bladder symptoms. Observed overnight, had interval worsening hemorrhages with cerebral edema from initial evaluation. Given 10mg decadron at [facility]. As per neurology recommendations there, maintain blood pressure less than 140/90, c/w Neuro checks, transferred here for neurosurg eval, possible MRI brain with and without contrast.    PCP: Dr. Jon Willis ([phone])

## 2017-08-01 NOTE — CONSULT NOTE ADULT - ASSESSMENT
Yolanda Castellanos, 89 year old female with history of HTN transferred here from [facility] after suffering a mechanical fall with relatively stable tSAH/SDH, increased IPH. Yolanda Castellanos, 89 year old female with history of HTN transferred here from [facility] after suffering a mechanical fall with relatively stable tSAH/SDH, increased IPH.    -repeat cth, [id number] if stable  -keppra 500 bid  -trauma eval

## 2017-08-01 NOTE — CONSULT NOTE ADULT - ATTENDING COMMENTS
89 Female with HTN admitted for SAH and SDH due to Head trauma from Mechanical Fall.    Problem/Recommendation - 1:  Problem: Subarachnoid bleed. Recommendation: Pt has SAH and SDH sec to Mechanical Fall leading to Head trauma seen on CT head last night showing Small acute bilateral frontal subdural hematomas and acute subarachnoid hemorrhages.   Repeat CT head this AM showed Mild bifrontal acute subarachnoid hemorrhages, grossly unchanged. Mild bifrontal subdural hemorrhages, slightly increased on the right and improved on the left. New mild acute subdural hemorrhages in the bilateral parietal and occipital regions. New left frontal intraparenchymal hemorrhage measuring approximately 3.2 x 3.4 cm with adjacent parenchymal edema.  No acute fracture cervical spine  patient's GCS is 15.  no focal neurological deficit noted.  will get CT head stat in case of neural deficits or change in mental status.  s/p Decadron 10 mg IV once for cerebral edema  neuro check sQ2h  fall precautions  Neuro Dr. Khanna consulted, plan for Transfer to [facility] [phone] ER, discussed with Attending who initiated Transfer.   Discussed case with pts PMD  Discussed plan with Intensivist.    Problem/Recommendation - 2:  ·  Problem: Subdural hemorrhage.  Recommendation: same plan as above.     Problem/Recommendation - 3:  ·  Problem: Essential hypertension.  Recommendation: c/w lisinopril 10 mg home med  keep BP <140/90 due to SDH and SAH.  Monitor BP.     Problem/Recommendation - 4:  ·  Problem: Prophylactic measure.  Recommendation: SCD for DVT ppx no chemical AC due to SAH and SDH.     -no anticoagulation  -neurochecks Q1 hr  -transfer

## 2017-08-01 NOTE — ED PROVIDER NOTE - ATTENDING CONTRIBUTION TO CARE
------------ATTENDING NOTE------------   89 yo F w/ family transferred for traumatic ich after mechanical fall 2 days ago, HD stable, at baseline mental status per daughters, no antiplatelets or anticoagulants, evaluated by NSGY on arrival, awaiting Trauma Service evaluation, repeat CT, awaiting admission disposition.  - Jared Fuller MD   ---------------------------------------------------------------------------

## 2017-08-01 NOTE — CONSULT NOTE ADULT - SUBJECTIVE AND OBJECTIVE BOX
Patient is a 89y old  Female who presents with a chief complaint of fall leading to head injury today (01 Aug 2017 01:35)      Initial HPI on admission:  HPI:  89 y/o F from home, lives alone, NO HHA, ambulates with the help of cane, PMH of arthritis, HTN, no significant PSHx presents to ED with f/o fall leading to head injury this afternoon. patient was at her baseline health and was on her way to laundry in her building she was coming down the ramp, when all of a sudden her legs gave away, she lost her balance, and fell on her head backwards. patient hit her head and noticed bleeding, then she woke up and went to lobby, doorman noticed that she was bleeding and called 911. patient denies any chest pain, dizziness, palpitations, nasuea, vomiting LOC before or after the event, remember entire event. patient had 1 episode of fall in the past. Patient denies any headache, vomiting, visual disturbances, weakness, numbness, tingling of extremities, difficulty swallowing or difficulty breathing, pain in extremities or restricted movement of nay joint, able to ambulate well just like before.  In ED, patient vitals were stable on admission, s/p 2 staples for laceration over occipital portion of scalp, CT head and cervical spine s/o Small acute bilateral frontal subdural hematomas and acute subarachnoid hemorrhages. No acute fracture cervical spine. ED physician discusssed with neurosurgery , since patient's GCS is 15, no indication for transfer, to get urgent CT head  if change in mental status, EKG_ NSR@71 BPM, right ventricular conduction delay, no ST T wave changes  will admit to telemetry for management of Small acute bilateral frontal subdural hematomas and acute subarachnoid hemorrhages, impending worsening of neuro function. (01 Aug 2017 01:35)      BRIEF HOSPITAL COURSE: ***    Review of Systems:  CONSTITUTIONAL: No fever, chills, or fatigue  EYES: No eye pain, visual disturbances, or discharge  ENMT:  No difficulty hearing, tinnitus, vertigo; No sinus or throat pain  NECK: No pain or stiffness  RESPIRATORY: No cough, wheezing, chills or hemoptysis; No shortness of breath  CARDIOVASCULAR: No chest pain, palpitations, dizziness, or leg swelling  GASTROINTESTINAL: No abdominal or epigastric pain. No nausea, vomiting, or hematemesis; No diarrhea or constipation. No melena or hematochezia.  GENITOURINARY: No dysuria, frequency, hematuria, or incontinence  NEUROLOGICAL: No headaches, memory loss, loss of strength, numbness, or tremors  SKIN: No itching, burning, rashes, or lesions   MUSCULOSKELETAL: No joint pain or swelling; No muscle, back, or extremity pain  PSYCHIATRIC: No depression, anxiety, mood swings, or difficulty sleeping    PAST MEDICAL & SURGICAL HISTORY:  Arthritis  HTN (hypertension)  No significant past surgical history    Allergies    Sulfacetamide Sodium (Unknown)    Intolerances      FAMILY HISTORY:  No pertinent family history in first degree relatives      Medications:  lisinopril 10 milliGRAM(s) Oral daily  atorvastatin 20 milliGRAM(s) Oral at bedtime  acetaminophen   Tablet. 650 milliGRAM(s) Oral every 6 hours PRN  dexamethasone  Injectable 10 milliGRAM(s) IV Push once      O2 supplementation/vent settings      Vital Signs Last 24 Hrs  T(C): 36.8 (01 Aug 2017 07:40), Max: 37.1 (2017 16:53)  T(F): 98.2 (01 Aug 2017 07:40), Max: 98.8 (2017 23:23)  HR: 88 (01 Aug 2017 07:40) (64 - 88)  BP: 105/70 (01 Aug 2017 07:40) (105/70 - 150/78)  BP(mean): --  RR: 17 (01 Aug 2017 07:40) (16 - 17)  SpO2: 98% (01 Aug 2017 07:40) (98% - 99%)              LABS:                        12.9   11.9  )-----------( 241      ( 01 Aug 2017 05:38 )             37.2     08-    140  |  105  |  20<H>  ----------------------------<  132<H>  4.3   |  25  |  0.65    Ca    8.8      01 Aug 2017 05:38  Phos  3.3     08-  Mg     2.0     08-    TPro  7.7  /  Alb  3.7  /  TBili  0.7  /  DBili  x   /  AST  39  /  ALT  25  /  AlkPhos  96  07-31      CARDIAC MARKERS ( 01 Aug 2017 07:39 )  0.036 ng/mL / x     / 75 U/L / x     / 2.2 ng/mL      CAPILLARY BLOOD GLUCOSE        PT/INR - ( 2017 20:40 )   PT: 11.3 sec;   INR: 1.04 ratio         PTT - ( 2017 20:40 )  PTT:26.2 sec  Urinalysis Basic - ( 01 Aug 2017 02:11 )    Color: Yellow / Appearance: Clear / S.020 / pH: x  Gluc: x / Ketone: Moderate  / Bili: Negative / Urobili: Negative   Blood: x / Protein: 15 / Nitrite: Negative   Leuk Esterase: Trace / RBC: 2-5 /HPF / WBC 3-5 /HPF   Sq Epi: x / Non Sq Epi: Few / Bacteria: Moderate /HPF      CULTURES:        Physical Examination:    General: No acute distress.      HEENT: Pupils equal, reactive to light.  Symmetric.    PULM: Clear to auscultation bilaterally, no significant sputum production    CVS: Regular rate and rhythm, no murmurs, rubs, or gallops    ABD: Soft, nondistended, nontender, normoactive bowel sounds, no masses    EXT: No edema, nontender    SKIN: Warm and well perfused, no rashes noted.    NEURO: Alert, oriented X3, interactive, non focal, Motor strenghth equal in all 4 extremities      RADIOLOGY:  CT Head No Cont (17 @ 10:02) > Mild bifrontal acute subarachnoid hemorrhages, grossly unchanged. Mild bifrontal subdural hemorrhages, slightly increased on the right and improved on the left. New mild acute subdural hemorrhages in the bilateral parietal and occipital regions. New left frontal intraparenchymal hemorrhage measuring approximately 3.2 x 3.4 cm with adjacent parenchymal edema.    EKG Sinus rhythm with RSR pattern @ 71 bpm Patient is a 89y old  Female who presents with a chief complaint of fall leading to head injury today (01 Aug 2017 01:35)      Initial HPI on admission:  HPI:  89 y/o F from home, lives alone, NO HHA, ambulates with the help of cane, PMH of arthritis, HTN, no significant PSHx presents to ED with f/o fall leading to head injury this afternoon. patient was at her baseline health and was on her way to laundry in her building she was coming down the ramp, when all of a sudden her legs gave away, she lost her balance, and fell on her head backwards. patient hit her head and noticed bleeding, then she woke up and went to lobby, doorman noticed that she was bleeding and called 911. patient denies any chest pain, dizziness, palpitations, nasuea, vomiting LOC before or after the event, remember entire event. patient had 1 episode of fall in the past. Patient denies any headache, vomiting, visual disturbances, weakness, numbness, tingling of extremities, difficulty swallowing or difficulty breathing, pain in extremities or restricted movement of nay joint, able to ambulate well just like before.  In ED, patient vitals were stable on admission, s/p 2 staples for laceration over occipital portion of scalp, CT head and cervical spine s/o Small acute bilateral frontal subdural hematomas and acute subarachnoid hemorrhages. No acute fracture cervical spine. ED physician discusssed with neurosurgery , since patient's GCS is 15, no indication for transfer, to get urgent CT head  if change in mental status, EKG_ NSR@71 BPM, right ventricular conduction delay, no ST T wave changes  will admit to telemetry for management of Small acute bilateral frontal subdural hematomas and acute subarachnoid hemorrhages, impending worsening of neuro function. (01 Aug 2017 01:35) Currently she is AAO x3      BRIEF HOSPITAL COURSE: ***    Review of Systems:  CONSTITUTIONAL: No fever, chills, or fatigue  EYES: No eye pain, visual disturbances, or discharge  ENMT:  No difficulty hearing, tinnitus, vertigo; No sinus or throat pain  NECK: No pain or stiffness  RESPIRATORY: No cough, wheezing, chills or hemoptysis; No shortness of breath  CARDIOVASCULAR: No chest pain, palpitations, dizziness, or leg swelling  GASTROINTESTINAL: No abdominal or epigastric pain. No nausea, vomiting, or hematemesis; No diarrhea or constipation. No melena or hematochezia.  GENITOURINARY: No dysuria, frequency, hematuria, or incontinence  NEUROLOGICAL: No headaches, memory loss, loss of strength, numbness, or tremors  SKIN: No itching, burning, rashes, or lesions   MUSCULOSKELETAL: No joint pain or swelling; No muscle, back, or extremity pain  PSYCHIATRIC: No depression, anxiety, mood swings, or difficulty sleeping    PAST MEDICAL & SURGICAL HISTORY:  Arthritis  HTN (hypertension)  No significant past surgical history    Allergies    Sulfacetamide Sodium (Unknown)    Intolerances      FAMILY HISTORY:  No pertinent family history in first degree relatives      Medications:  lisinopril 10 milliGRAM(s) Oral daily  atorvastatin 20 milliGRAM(s) Oral at bedtime  acetaminophen   Tablet. 650 milliGRAM(s) Oral every 6 hours PRN  dexamethasone  Injectable 10 milliGRAM(s) IV Push once      O2 supplementation/vent settings      Vital Signs Last 24 Hrs  T(C): 36.8 (01 Aug 2017 07:40), Max: 37.1 (2017 16:53)  T(F): 98.2 (01 Aug 2017 07:40), Max: 98.8 (2017 23:23)  HR: 88 (01 Aug 2017 07:40) (64 - 88)  BP: 105/70 (01 Aug 2017 07:40) (105/70 - 150/78)  BP(mean): --  RR: 17 (01 Aug 2017 07:40) (16 - 17)  SpO2: 98% (01 Aug 2017 07:40) (98% - 99%)              LABS:                        12.9   11.9  )-----------( 241      ( 01 Aug 2017 05:38 )             37.2     08    140  |  105  |  20<H>  ----------------------------<  132<H>  4.3   |  25  |  0.65    Ca    8.8      01 Aug 2017 05:38  Phos  3.3     08-  Mg     2.0     08    TPro  7.7  /  Alb  3.7  /  TBili  0.7  /  DBili  x   /  AST  39  /  ALT  25  /  AlkPhos  96  07      CARDIAC MARKERS ( 01 Aug 2017 07:39 )  0.036 ng/mL / x     / 75 U/L / x     / 2.2 ng/mL      CAPILLARY BLOOD GLUCOSE        PT/INR - ( 2017 20:40 )   PT: 11.3 sec;   INR: 1.04 ratio         PTT - ( 2017 20:40 )  PTT:26.2 sec  Urinalysis Basic - ( 01 Aug 2017 02:11 )    Color: Yellow / Appearance: Clear / S.020 / pH: x  Gluc: x / Ketone: Moderate  / Bili: Negative / Urobili: Negative   Blood: x / Protein: 15 / Nitrite: Negative   Leuk Esterase: Trace / RBC: 2-5 /HPF / WBC 3-5 /HPF   Sq Epi: x / Non Sq Epi: Few / Bacteria: Moderate /HPF      CULTURES:        Physical Examination:    General: No acute distress.      HEENT: Pupils equal, reactive to light.  Symmetric.    PULM: Clear to auscultation bilaterally, no significant sputum production    CVS: Regular rate and rhythm, no murmurs, rubs, or gallops    ABD: Soft, nondistended, nontender, normoactive bowel sounds, no masses    EXT: No edema, nontender    SKIN: Warm and well perfused, no rashes noted.    NEURO: Alert, oriented X3, interactive, non focal, Motor strenghth equal in all 4 extremities      RADIOLOGY:  CT Head No Cont (17 @ 10:02) > Mild bifrontal acute subarachnoid hemorrhages, grossly unchanged. Mild bifrontal subdural hemorrhages, slightly increased on the right and improved on the left. New mild acute subdural hemorrhages in the bilateral parietal and occipital regions. New left frontal intraparenchymal hemorrhage measuring approximately 3.2 x 3.4 cm with adjacent parenchymal edema.    EKG Sinus rhythm with RSR pattern @ 71 bpm

## 2017-08-01 NOTE — H&P ADULT - PROBLEM SELECTOR PLAN 1
likely mechanical.  will admit to telemetry to r/o arrythmias even though patient do not c/o any symptoms  before or after the fall.   CT head and cervical spine s/o Small acute bilateral frontal subdural hematomas and acute subarachnoid hemorrhages. No acute fracture cervical spine  will get X ray pelvis and X ray of both shoulders to r/o any fractures.  fall precautions  pain management. likely mechanical.  will admit to telemetry to r/o arrhythmias even though patient do not c/o any symptoms  before or after the fall.   CT head and cervical spine s/o Small acute bilateral frontal subdural hematomas and acute subarachnoid hemorrhages. No acute fracture cervical spine  will get X ray pelvis and X ray of both shoulders to r/o any fractures.  fall precautions  pain management.  EKG-   orthostatics-   will get cardiac enzymes likely mechanical.  will admit to telemetry to r/o arrhythmias even though patient do not c/o any symptoms  before or after the fall.   CT head and cervical spine s/o Small acute bilateral frontal subdural hematomas and acute subarachnoid hemorrhages. No acute fracture cervical spine  will get X ray pelvis and X ray of both shoulders to r/o any fractures.  fall precautions  pain management.  EKG- NSR@71 BPM, right ventricular conduction delay, no ST T wave changes  orthostatics- negative  will get cardiac enzymes likely mechanical.  will admit to telemetry to r/o arrhythmias even though patient do not c/o any symptoms  before or after the fall.   CT head and cervical spine s/o Small acute bilateral frontal subdural hematomas and acute subarachnoid hemorrhages. No acute fracture cervical spine  will get X ray pelvis and X ray of both shoulders to r/o any fractures.  fall precautions  pain management.  EKG- NSR@71 BPM, right ventricular conduction delay, no ST T wave changes  orthostatics- negative  will get cardiac enzymes.  neurology- DR. Khanna consulted.  will get CT head in AM and at 6pm.

## 2017-08-01 NOTE — H&P ADULT - HISTORY OF PRESENT ILLNESS
p ([id number])     HPI: Yolanda Castellanos, 89 year old female with history of HTN transferred here from [facility] after suffering a mechanical fall yesterday late afternoon. Denies any LOC. Patient only on lisinopril, no AC.  Patient ambulates with a walker and goes grocery shopping on her own.  Received 2 sets of CT scans before transfer. 16 hour interval scan from [facility] showed stable mild bifrontal tsah/SDH, new mild SDH  in bilateral parietal and occipital regions, and new left frontal IPH (3.2x3.4).    PAST MEDICAL HISTORY   Hypertension    PAST SURGICAL HISTORY         MEDICATIONS:  Antibiotics:    Neuro:    Anticoagulation:    Other:  sodium chloride 0.9%. 1000 milliLiter(s) IV Continuous <Continuous>      SOCIAL HISTORY:   Occupation:   Marital Status:     FAMILY HISTORY:      REVIEW OF SYSTEMS:  Check here if all are normal other than Neurological [x]  General:  Eyes:  ENT:  Cardiac:  Respiratory:  GI:  Musculoskeletal:   Skin:  Neurologic:   Psychiatric:     PHYSICAL EXAMINATION:   T(C): 36.8 (08-01-17 @ 13:43), Max: 36.8 (08-01-17 @ 13:43)  HR: 57 (08-01-17 @ 13:43) (50 - 57)  BP: 108/96 (08-01-17 @ 13:43) (108/96 - 126/62)  RR: 18 (08-01-17 @ 13:43) (16 - 18)  SpO2: 96% (08-01-17 @ 13:43) (96% - 96%)  Wt(kg): --    General Examination:     Neurologic Examination:             Higher functions                 Normal [x]               Abnormal:      Cranial Nerves (ii-xii)           Normal [x]              Abnormal:     Motor Exam                       Normal [x]              Abnormal:                               Sensory Exam                   Normal [x]              Abnormal:    Reflexes                            Normal [x]              Abnormal:     Coordination                      Normal [x]              Abnormal:    Other:     LABS:                RADIOLOGY & ADDITIONAL STUDIES:    CTH: pending

## 2017-08-01 NOTE — DISCHARGE NOTE ADULT - HOSPITAL COURSE
89 y/o F from home, lives alone, NO HHA, ambulates with the help of cane, PMH of arthritis, HTN, no significant PSHx presents to ED with f/o fall leading to head injury yesterday afternoon. patient was at her baseline health and was on her way to laundry in her building she was coming down the ramp, when all of a sudden her legs gave away, she lost her balance, and fell on her head backwards. patient hit her head and noticed bleeding, then she woke up and went to [facility], sil noticed that she was bleeding and called 911. patient denies any chest pain, dizziness, palpitations, nasuea, vomiting LOC before or after the event, remember entire event. patient had 1 episode of fall in the past. Patient denies any headache, vomiting, visual disturbances, weakness, numbness, tingling of extremities, difficulty swallowing or difficulty breathing, pain in extremities or restricted movement of nay joint, able to ambulate well just like before.    In ED, patient vitals were stable on admission, s/p 2 staples for laceration over occipital portion of scalp, CT head and cervical spine s/o Small acute bilateral frontal subdural hematomas and acute subarachnoid hemorrhages. No acute fracture cervical spine. ED physician discussed with neurosurgery   EKG_ NSR@71 BPM, right ventricular conduction delay, no ST T wave changes    Given 1 dose dexamethasone 10mg x1 IVPB      < from: CT Head No Cont (08.01.17 @ 10:02) >  Impression: Mild bifrontal acute subarachnoid hemorrhages, grossly   unchanged. Mild bifrontal subdural hemorrhages, slightly increased on the   right and improved on the left. New mild acute subdural hemorrhages in   the bilateral parietal and occipital regions. New left frontal   intraparenchymal hemorrhage measuring approximately 3.2 x 3.4 cm with   adjacent parenchymal edema.    patient to be transferred for further management.

## 2017-08-01 NOTE — CONSULT NOTE ADULT - SUBJECTIVE AND OBJECTIVE BOX
TRAUMA SERVICE (Acute Care Surgery / ACS - #[id number]) - CONSULT NOTE  --------------------------------------------------------------------------------------------    TRAUMA ACTIVATION LEVEL:   3  MECHANISM OF INJURY:      [] Blunt  	[] MVC	[X] Fall	[] Pedestrian Struck	[] Motorcycle accident      [] Penetrating  	[] Gun Shot Wound 		[] Stab Wound    GCS: 	E: 4	V: 5	M: 6    Patient is a 89y old  Female who presents with a chief complaint of mechanical fall    HPI: Pt with PMH significant only for HTN BIB EMS to [facility] after suffering mechanical fall at home (no LOC). Initial scan at [facility] on 7/31 demonstrated acute SAH, and small b/l frontal SDH with no fracture. Subsequent imaging on 8/1 demonstrated unchanged SAH, slight increase in SDH and new mild SDH in b/l parietal and occipital regions as well as left frontal intraparenchymal hemorrhage with edema.  The patient was transferred to [facility] ED for further management    Primary Survey:   A - airway intact  B - bilateral breath sounds and good chest rise  C - initial BP  BP: 108/96 (08-01-17 @ 13:43)HR: 57 (08-01-17 @ 13:43), palpable pulses in all extremities  D - GCS 15 on arrival  Exposure obtained      Secondary Survey:  General: NAD  Neuro: CN II-XII intact, motor strength intact b/l UE and LE; sensation grossly intact to light touch b/l UE/LE  HEENT: Normocephalic, scalp lac to right occiput with staples, no active hemorrhage, dried blood in hair, no palpable step-offs, EOMI, PEERLA.  Neck: Soft, midline trachea.  Chest: No chest wall tenderness.   Cardiac: S1, S2, RRR  Respiratory: Bilateral breath sounds, clear and equal bilaterally  Abdomen: Soft, non-distended, non-tender, no rebound, no guarding, no masses palpated  Ext: palp radial b/l UE, b/l DP palp in Lower Extrem, motor and sensory grossly intact in all 4 extremities  Back: no TTP, no palpable runoff/stepoff/deformity, no ecchymoses      Patient denies fevers/chills, denies lightheadedness/dizziness, denies SOB/chest pain, denies nausea/vomiting, denies constipation/diarrhea.     ROS: 10-system review is otherwise negative except HPI above.      PAST MEDICAL & SURGICAL HISTORY:  Hypertension    FAMILY HISTORY:    [X] Family history not pertinent as reviewed with the patient and family    SOCIAL HISTORY:  Lives alone in an apartment without an aide    ALLERGIES: sulfonamides (Unknown)      HOME MEDICATIONS:  Lisinopril 10mg    --------------------------------------------------------------------------------------------    Vitals:   T(C): 36.8 (08-01-17 @ 13:43), Max: 36.8 (08-01-17 @ 13:43)  HR: 57 (08-01-17 @ 13:43) (50 - 57)  BP: 108/96 (08-01-17 @ 13:43) (108/96 - 126/62)  BP(mean): --  RR: 18 (08-01-17 @ 13:43) (16 - 18)  SpO2: 96% (08-01-17 @ 13:43) (96% - 96%)  Wt(kg): --      IMAGING  See [facility] for prior imaging. After transfer; scan obtained at [facility] demonstrates no changes since scan obtained 8/1 AM at [facility]    --------------------------------------------------------------------------------------------

## 2017-08-01 NOTE — CONSULT NOTE ADULT - ASSESSMENT
ASSESSMENT: Patient is a 89y old f s/p mechanical fall with SAH, SDH which increased 7/31 - 8/1, but has been unchanged from 9:55 AM to 4:30 PM. Patient has no other injuries.    PLAN:  - Patient is stable on exam with no other injuries and no focal neurological deficits  - Patient to be admitted to neurosurgery overnight for observation  - Plan to be discussed with Attending, Dr. Aleks Pruitt PGY2

## 2017-08-01 NOTE — ED ADULT NURSE REASSESSMENT NOTE - NS ED NURSE REASSESS COMMENT FT1
Patient resting, remains alert, responsive, resting in stretcher, moving all extremities. Awaiting inpatient bed.
Pt refuse CT scan NP Chichi aware
received Pt on stretcher alert and verbally responsive NAD attached to teleC transferred to [facility], pt care endorsed to Cesilia FRAZIER

## 2017-08-01 NOTE — H&P ADULT - NSHPREVIEWOFSYSTEMS_GEN_ALL_CORE
ROS negative  for fever, SOB, chest pain, cough, rash, headache, dizziness, diaphoresis, palpitations, abdominal pain, weakness, numbness, tingling of extremities, diarrhea, constipation, urinary disturbances.

## 2017-08-01 NOTE — DISCHARGE NOTE ADULT - CARE PLAN
Principal Discharge DX:	Subdural hemorrhage  Goal:	patient will remain symptom free  Instructions for follow-up, activity and diet:	-TRANSFER TO [facility] for neurosurgery evaluation  -maintain BP less than 140/90  -maintain aspiration precautions  -neuro checks per hospital protocol  Secondary Diagnosis:	Arthritis  Secondary Diagnosis:	HTN (hypertension)  Instructions for follow-up, activity and diet:	-continue lisinopril  Secondary Diagnosis:	Prophylactic measure  Instructions for follow-up, activity and diet:	IMPROVE score 2  -no chemical DVT prophylaxis due to SAH  Secondary Diagnosis:	Fall  Instructions for follow-up, activity and diet:	maintain fall precautions Principal Discharge DX:	Subdural hemorrhage  Goal:	patient will remain symptom free  Instructions for follow-up, activity and diet:	-TRANSFER TO [facility] for neurosurgery evaluation  -maintain BP less than 140/90  -maintain aspiration precautions  -neuro checks per hospital protocol  Secondary Diagnosis:	HTN (hypertension)  Instructions for follow-up, activity and diet:	-continue lisinopril  Secondary Diagnosis:	Prophylactic measure  Instructions for follow-up, activity and diet:	IMPROVE score 2  -no chemical DVT prophylaxis due to SAH  Secondary Diagnosis:	Fall  Instructions for follow-up, activity and diet:	maintain fall precautions    continue statin Principal Discharge DX:	Subdural hemorrhage  Goal:	patient will remain symptom free  Instructions for follow-up, activity and diet:	-TRANSFER TO [facility] for neurosurgery evaluation  -maintain BP less than 140/90  -maintain aspiration precautions  -neuro checks per hospital protocol  Secondary Diagnosis:	HTN (hypertension)  Instructions for follow-up, activity and diet:	-continue lisinopril  Secondary Diagnosis:	Prophylactic measure  Instructions for follow-up, activity and diet:	IMPROVE score 2  -no chemical DVT prophylaxis due to SAH  Secondary Diagnosis:	Fall  Instructions for follow-up, activity and diet:	maintain fall precautions    continue statin Principal Discharge DX:	Subdural hemorrhage  Goal:	patient will remain symptom free  Instructions for follow-up, activity and diet:	-TRANSFER TO [facility] for neurosurgery evaluation  -maintain BP less than 140/90  -maintain aspiration precautions  -neuro checks per hospital protocol  Secondary Diagnosis:	HTN (hypertension)  Instructions for follow-up, activity and diet:	-continue lisinopril  Secondary Diagnosis:	Prophylactic measure  Instructions for follow-up, activity and diet:	IMPROVE score 2  -no chemical DVT prophylaxis due to SAH  Secondary Diagnosis:	Fall  Instructions for follow-up, activity and diet:	maintain fall precautions    continue statin

## 2017-08-01 NOTE — ED PROVIDER NOTE - PHYSICAL EXAMINATION
PE: CONSTITUTIONAL: Well appearing, well nourished, in no apparent distress. ENMT: Airway patent, nasal mucosa clear, mouth with normal mucosa. HEAD: NCAT EYES: PERRL, EOMI bilaterally CARDIAC: RRR, no m/r/g, no pedal edema RESPIRATORY: CTA bilaterally, no adventitious sounds GI: Abdomen non-distended, non-tender MSK: Spine appears normal, range of motion is not limited, no muscle/joint tenderness NEURO: CNII-XII grossly intact, 5/5 strength, full sensation all extremities, gait intact SKIN: Skin tone normal in color, warm and dry. No evidence of rash. + 2cm stapledx2 laceration on the occiput, healing.

## 2017-08-01 NOTE — ED PROVIDER NOTE - PROGRESS NOTE DETAILS
Patient asymptomatic, CT Head stable from 10am this morning, will admit to the floor as discussed with neurosurgery.  - Eric Moreno MD

## 2017-08-01 NOTE — ED PROVIDER NOTE - CARE PLAN
Principal Discharge DX:	Cerebral edema  Secondary Diagnosis:	SDH (subdural hematoma)  Secondary Diagnosis:	SAH (subarachnoid hemorrhage)

## 2017-08-01 NOTE — H&P ADULT - PROBLEM SELECTOR PLAN 2
CT head and cervical spine s/o Small acute bilateral frontal subdural hematomas and acute subarachnoid hemorrhages. No acute fracture cervical spine  patient's GCS is 15.  no focal neurological deficit noted.  will get CT head in AM and PRN stat in case of neural deficits.  ED physician discussed with neurosurgery , since patient's GCS is 15, no indication for transfer, to get urgent CT head  if change in mental status.  neuro check sQ2h  fall precautions  will keep NPO overnight, impending worsening of neural function.

## 2017-08-02 PROCEDURE — 99231 SBSQ HOSP IP/OBS SF/LOW 25: CPT

## 2017-08-02 RX ORDER — DOCUSATE SODIUM 100 MG
100 CAPSULE ORAL THREE TIMES A DAY
Qty: 0 | Refills: 0 | Status: DISCONTINUED | OUTPATIENT
Start: 2017-08-02 | End: 2017-08-03

## 2017-08-02 RX ORDER — LISINOPRIL 2.5 MG/1
10 TABLET ORAL DAILY
Qty: 0 | Refills: 0 | Status: DISCONTINUED | OUTPATIENT
Start: 2017-08-02 | End: 2017-08-03

## 2017-08-02 RX ORDER — LEVETIRACETAM 250 MG/1
250 TABLET, FILM COATED ORAL
Qty: 0 | Refills: 0 | Status: DISCONTINUED | OUTPATIENT
Start: 2017-08-02 | End: 2017-08-03

## 2017-08-02 RX ORDER — SENNA PLUS 8.6 MG/1
2 TABLET ORAL AT BEDTIME
Qty: 0 | Refills: 0 | Status: DISCONTINUED | OUTPATIENT
Start: 2017-08-02 | End: 2017-08-03

## 2017-08-02 RX ADMIN — Medication 100 MILLIGRAM(S): at 17:59

## 2017-08-02 RX ADMIN — LISINOPRIL 10 MILLIGRAM(S): 2.5 TABLET ORAL at 08:17

## 2017-08-02 RX ADMIN — SENNA PLUS 2 TABLET(S): 8.6 TABLET ORAL at 21:16

## 2017-08-02 RX ADMIN — LEVETIRACETAM 500 MILLIGRAM(S): 250 TABLET, FILM COATED ORAL at 06:59

## 2017-08-02 RX ADMIN — Medication 100 MILLIGRAM(S): at 21:16

## 2017-08-02 RX ADMIN — LEVETIRACETAM 250 MILLIGRAM(S): 250 TABLET, FILM COATED ORAL at 17:59

## 2017-08-02 NOTE — CHART NOTE - NSCHARTNOTEFT_GEN_A_CORE
Attending attestation note to the H&P (unable to be edited as patient discharged).  Patient was seen and examined at bedside yesterday  Repeat CT head showed multiple scattered new hemorrhages. Case was discussed at length with neurosurgery team in NSU through transport center and agreed to transfer patient to their service.  Physical exam  General: patient is alert awake oriented, daughter at bedside,   HEENT: wnl, bilateral pupils are equal, round and reactive to light  Heart: s1, S2 normal  Chest: clear bilateral  Abdomen: NT ND  Neurological: patient able to move all 4 extremities against gravity, denies any sensory complaints,  tongue midline, bilateral NL folds equal,   ROM of the eyes intact  A/P:  patient to be transferred to [facility]

## 2017-08-02 NOTE — PROGRESS NOTE ADULT - ASSESSMENT
89 year old female with history of HTN transferred here from [facility] after suffering a mechanical fall yesterday late afternoon. Denies any LOC. Patient only on lisinopril, no AC.  Patient ambulates with a walker and goes grocery shopping on her own.  Received 2 sets of CT scans before transfer. 16 hour interval scan from [facility] showed stable mild bifrontal tsah/SDH, new mild SDH  in bilateral parietal and occipital regions, and new left frontal IPH (3.2x3.4). stable since yesterday                       PLAN:  Neuro: neuro checks q 4 hrs, cont keppra for seizure prop  Respiratory: no active issues  CV: resume lisinopril   Endocrine: no active issuesd  Heme/Onc:             DVT ppx: will discuss with Dr. Bales  Renal:   ID:   GI:   Social/Family:   Discharge planning: awaiting PT eval to ensure safe discharge 89 year old female with history of HTN transferred here from [facility] after suffering a mechanical fall yesterday late afternoon. Denies any LOC. Patient only on lisinopril, no AC.  Patient ambulates with a walker and goes grocery shopping on her own.  Received 2 sets of CT scans before transfer. 16 hour interval scan from [facility] showed stable mild bifrontal tsah/SDH, new mild SDH  in bilateral parietal and occipital regions, and new left frontal IPH (3.2x3.4). stable since yesterday                       PLAN:  Neuro: neuro checks q 4 hrs, cont keppra for seizure prop  Respiratory: no active issues  CV: resume lisinopril   Endocrine: no active issues  Heme/Onc:             DVT ppx: will discuss with Dr. Bales  Renal: creatine stable  ID: afebrile  GI: start diet, no need for GI prophylaxis   Social/Family:   Discharge planning: awaiting PT eval to ensure safe discharge

## 2017-08-02 NOTE — PROGRESS NOTE ADULT - SUBJECTIVE AND OBJECTIVE BOX
SUBJECTIVE: No complaints      Vital Signs Last 24 Hrs  T(C): 36.4 (17 @ 07:04), Max: 37.1 (17 @ 19:10)  T(F): 97.6 (17 @ 07:04), Max: 98.8 (17 @ 19:10)  HR: 46 (17 @ 07:04) (46 - 72)  BP: 127/79 (17 @ 07:04) (107/59 - 128/68)  BP(mean): --  RR: 18 (17 @ 07:04) (16 - 20)  SpO2: 95% (17 @ 07:04) (95% - 100%)    PHYSICAL EXAM:    Constitutional: No Acute Distress     Neurological: AOx3, Following Commands, Moving all Extremities             Motor exam: intact                                                          Sensation: [x ] intact to light touch     Pulmonary: Clear to Auscultation, No rales, No rhonchi, No wheezes     Cardiovascular: S1, S2, Regular rate and rhythm     Gastrointestinal: Soft, Non-tender, Non-distended     Extremities: No calf tenderness         LABS:             IMAGINmm axial sections of the brain were obtained from base to vertex,   without the intravenous administration of contrast material. Coronal and   sagittal computer generated reconstructed views are available.    [facility] is made with [facility] 2017 [facility]    There is no change in the appearance compared to 9:55 AM scan. There is   increased hemorrhage in the left inferior frontal lobe compared with   2017. There is also increased hemorrhage in the interhemispheric   fissureand along the 4:00 AM. Right inferior frontal hemorrhagic   contusion is unchanged. Subdural hematomas in the parietal region   bilaterally are also unchanged from the earlier today but are new from   2017.    There is moderate atrophy with ventricular and sulcal prominence.    Impression: No change since 9:55 AM. Subdural subarachnoid and   parenchymal hemorrhage which has increased since 2017.    < end of copied text >    MEDICATIONS:      Neuro:  levETIRAcetam 250 milliGRAM(s) Oral two times a day    Cardiac:  lisinopril 10 milliGRAM(s) Oral daily        GI/:  docusate sodium 100 milliGRAM(s) Oral three times a day  senna 2 Tablet(s) Oral at bedtime    Other:         DIET: pending dysphagia exam

## 2017-08-03 ENCOUNTER — TRANSCRIPTION ENCOUNTER (OUTPATIENT)
Age: 82
End: 2017-08-03

## 2017-08-03 VITALS
SYSTOLIC BLOOD PRESSURE: 116 MMHG | OXYGEN SATURATION: 100 % | RESPIRATION RATE: 18 BRPM | HEART RATE: 67 BPM | TEMPERATURE: 98 F | DIASTOLIC BLOOD PRESSURE: 79 MMHG

## 2017-08-03 LAB
-  AMIKACIN: SIGNIFICANT CHANGE UP
-  AMIKACIN: SIGNIFICANT CHANGE UP
-  AMPICILLIN/SULBACTAM: SIGNIFICANT CHANGE UP
-  AMPICILLIN/SULBACTAM: SIGNIFICANT CHANGE UP
-  AMPICILLIN: SIGNIFICANT CHANGE UP
-  AMPICILLIN: SIGNIFICANT CHANGE UP
-  AZTREONAM: SIGNIFICANT CHANGE UP
-  AZTREONAM: SIGNIFICANT CHANGE UP
-  CEFAZOLIN: SIGNIFICANT CHANGE UP
-  CEFAZOLIN: SIGNIFICANT CHANGE UP
-  CEFEPIME: SIGNIFICANT CHANGE UP
-  CEFEPIME: SIGNIFICANT CHANGE UP
-  CEFOXITIN: SIGNIFICANT CHANGE UP
-  CEFOXITIN: SIGNIFICANT CHANGE UP
-  CEFTAZIDIME: SIGNIFICANT CHANGE UP
-  CEFTAZIDIME: SIGNIFICANT CHANGE UP
-  CEFTRIAXONE: SIGNIFICANT CHANGE UP
-  CEFTRIAXONE: SIGNIFICANT CHANGE UP
-  CIPROFLOXACIN: SIGNIFICANT CHANGE UP
-  CIPROFLOXACIN: SIGNIFICANT CHANGE UP
-  ERTAPENEM: SIGNIFICANT CHANGE UP
-  ERTAPENEM: SIGNIFICANT CHANGE UP
-  GENTAMICIN: SIGNIFICANT CHANGE UP
-  GENTAMICIN: SIGNIFICANT CHANGE UP
-  IMIPENEM: SIGNIFICANT CHANGE UP
-  LEVOFLOXACIN: SIGNIFICANT CHANGE UP
-  LEVOFLOXACIN: SIGNIFICANT CHANGE UP
-  MEROPENEM: SIGNIFICANT CHANGE UP
-  MEROPENEM: SIGNIFICANT CHANGE UP
-  NITROFURANTOIN: SIGNIFICANT CHANGE UP
-  NITROFURANTOIN: SIGNIFICANT CHANGE UP
-  PIPERACILLIN/TAZOBACTAM: SIGNIFICANT CHANGE UP
-  PIPERACILLIN/TAZOBACTAM: SIGNIFICANT CHANGE UP
-  TOBRAMYCIN: SIGNIFICANT CHANGE UP
-  TOBRAMYCIN: SIGNIFICANT CHANGE UP
-  TRIMETHOPRIM/SULFAMETHOXAZOLE: SIGNIFICANT CHANGE UP
-  TRIMETHOPRIM/SULFAMETHOXAZOLE: SIGNIFICANT CHANGE UP
CULTURE RESULTS: SIGNIFICANT CHANGE UP
METHOD TYPE: SIGNIFICANT CHANGE UP
METHOD TYPE: SIGNIFICANT CHANGE UP
ORGANISM # SPEC MICROSCOPIC CNT: SIGNIFICANT CHANGE UP
SPECIMEN SOURCE: SIGNIFICANT CHANGE UP

## 2017-08-03 PROCEDURE — 99285 EMERGENCY DEPT VISIT HI MDM: CPT | Mod: 25

## 2017-08-03 PROCEDURE — 90715 TDAP VACCINE 7 YRS/> IM: CPT

## 2017-08-03 PROCEDURE — 90471 IMMUNIZATION ADMIN: CPT

## 2017-08-03 PROCEDURE — 99231 SBSQ HOSP IP/OBS SF/LOW 25: CPT

## 2017-08-03 PROCEDURE — 97161 PT EVAL LOW COMPLEX 20 MIN: CPT

## 2017-08-03 PROCEDURE — 70450 CT HEAD/BRAIN W/O DYE: CPT

## 2017-08-03 PROCEDURE — 93005 ELECTROCARDIOGRAM TRACING: CPT

## 2017-08-03 RX ORDER — SENNA PLUS 8.6 MG/1
2 TABLET ORAL
Qty: 0 | Refills: 0 | COMMUNITY
Start: 2017-08-03

## 2017-08-03 RX ORDER — LISINOPRIL 2.5 MG/1
1 TABLET ORAL
Qty: 0 | Refills: 0 | COMMUNITY
Start: 2017-08-03

## 2017-08-03 RX ORDER — DOCUSATE SODIUM 100 MG
1 CAPSULE ORAL
Qty: 0 | Refills: 0 | COMMUNITY
Start: 2017-08-03

## 2017-08-03 RX ORDER — LEVETIRACETAM 250 MG/1
1 TABLET, FILM COATED ORAL
Qty: 30 | Refills: 0 | OUTPATIENT
Start: 2017-08-03

## 2017-08-03 RX ADMIN — LEVETIRACETAM 250 MILLIGRAM(S): 250 TABLET, FILM COATED ORAL at 17:18

## 2017-08-03 RX ADMIN — Medication 100 MILLIGRAM(S): at 05:31

## 2017-08-03 RX ADMIN — LISINOPRIL 10 MILLIGRAM(S): 2.5 TABLET ORAL at 05:31

## 2017-08-03 RX ADMIN — LEVETIRACETAM 250 MILLIGRAM(S): 250 TABLET, FILM COATED ORAL at 05:31

## 2017-08-03 NOTE — PHYSICAL THERAPY INITIAL EVALUATION ADULT - PERTINENT HX OF CURRENT PROBLEM, REHAB EVAL
89 F PMHx HTN transferred here from [facility] after suffering a mechanical fall yesterday late afternoon. Denies any LOC. Patient only on lisinopril, no AC. Received 2 sets of CT scans before transfer. 16 hour interval scan from [facility] showed stable mild bifrontal tsah/SDH, new mild SDH  in bilateral parietal and occipital regions, and new left frontal IPH (3.2x3.4).

## 2017-08-03 NOTE — DISCHARGE NOTE ADULT - CARE PROVIDER_API CALL
Braydon Bales), Neurological Surgery  [address] [place] [address]  Phone: [phone]  Fax: [phone]

## 2017-08-03 NOTE — DISCHARGE NOTE ADULT - COMMUNITY RESOURCES
Please refer to licensed agency to hire HHAs privately as discussed. PT recommends 24/7 supervision & assist with ADLs, ambulation as discussed.

## 2017-08-03 NOTE — PROGRESS NOTE ADULT - ASSESSMENT
89 year old female with history of HTN transferred here from [facility] after suffering a mechanical fall yesterday late afternoon. Denies any LOC. Patient only on lisinopril, no AC.  Patient ambulates with a walker and goes grocery shopping on her own.  Received 2 sets of CT scans before transfer. 16 hour interval scan from [facility] showed stable mild bifrontal tsah/SDH, new mild SDH  in bilateral parietal and occipital regions, and new left frontal IPH    PLAN:  Neuro: neuro checks q 4 hrs, cont keppra for seizure prop until follow up with Dr. Bales  Respiratory: no active issues  CV: continue lisinopril   Endocrine: no active issues  Heme/Onc:             DVT ppx: will discuss with Dr. Bales  Renal: creatine stable  ID: afebrile  GI: start diet, no need for GI prophylaxis   Social/Family:   Discharge planning: awaiting PT eval to ensure safe discharge

## 2017-08-03 NOTE — PHYSICAL THERAPY INITIAL EVALUATION ADULT - PLANNED THERAPY INTERVENTIONS, PT EVAL
transfer training/balance training/gait training gait training/balance training/transfer training/bed mobility training

## 2017-08-03 NOTE — PHYSICAL THERAPY INITIAL EVALUATION ADULT - AMBULATION SKILLS, REHAB EVAL
needs device/independent/uses straight cane and  her shopping cart as an assistive device for outdoor mobility

## 2017-08-03 NOTE — DISCHARGE NOTE ADULT - PATIENT PORTAL LINK FT
“You can access the [facility] Patient Portal, offered by [facility], by registering with the following website: [email or web address]”

## 2017-08-03 NOTE — DISCHARGE NOTE ADULT - HOSPITAL COURSE
89 year old female with history of HTN transferred here from [facility] after suffering a mechanical fall yesterday late afternoon. Denies any LOC. Patient only on lisinopril, no AC.  Patient ambulates with a walker and goes grocery shopping on her own.  Received 2 sets of CT scans before transfer. 16 hour interval scan from [facility] showed stable mild bifrontal tsah/SDH, new mild SDH  in bilateral parietal and occipital regions, and new left frontal IPH.  Patient had stable ct.  Patient seen by physical therapy and recommended for home, home pt with rolling walker.  On day of discharge patient neurologically and medically cleared for discharged.

## 2017-08-03 NOTE — PROGRESS NOTE ADULT - ATTENDING COMMENTS
Pt seen and examined.  Alert, awake, in NAD.  Conversant.  Pt to be evaluated by PT today.  Discussed importance of safety with patient and to abide by need for assistive devices, if recommended.  Pt to follow up in 2 weeks pending possible discharge today.
Pt seen earlier today.  Alert awake, conversant.  Feels well with no headache or dizziness.  HARRIS.  Gait is moderately unsteady requiring assistance.  Posterior head wound intact and dry with staples.  CT yesterday was stable.  Pt for PT evaluation and discharge planning.  Discussed with daughter.

## 2017-08-03 NOTE — DISCHARGE NOTE ADULT - PLAN OF CARE
ct scans stable follow up with Dr. Bales in office in 1 week.  Call office for appointment. continue lisinopril and follow follow up with Dr. Bales in office in 1 week.  Call office for appointment. [phone]

## 2017-08-03 NOTE — DISCHARGE NOTE ADULT - NS AS ACTIVITY OBS
Walking-Indoors allowed/Bathing allowed/Do not drive or operate machinery/No Heavy lifting/straining/Walking-Outdoors allowed/Showering allowed/Do not make important decisions/Stairs allowed

## 2017-08-03 NOTE — DISCHARGE NOTE ADULT - HOME CARE AGENCY
[place] [phone] A nurse will vist the day after discharge to assess you and need for Home PT & HHA

## 2017-08-03 NOTE — PROGRESS NOTE ADULT - SUBJECTIVE AND OBJECTIVE BOX
SUBJECTIVE: 89 year old female with history of HTN transferred here from [facility] after suffering a mechanical fall yesterday late afternoon. Denies any LOC. Patient only on lisinopril, no AC.  Patient ambulates with a walker and goes grocery shopping on her own.  Received 2 sets of CT scans before transfer. 16 hour interval scan from [facility] showed stable mild bifrontal tsah/SDH, new mild SDH  in bilateral parietal and occipital regions, and new left frontal IPH    OVERNIGHT EVENTS: no overnight events     Vital Signs Last 24 Hrs  T(C): 36.4 (03 Aug 2017 05:29), Max: 36.6 (02 Aug 2017 21:42)  T(F): 97.6 (03 Aug 2017 05:29), Max: 97.8 (02 Aug 2017 21:42)  HR: 56 (03 Aug 2017 05:29) (52 - 73)  BP: 120/76 (03 Aug 2017 05:29) (118/62 - 127/66)  BP(mean): --  RR: 18 (03 Aug 2017 05:29) (18 - 18)  SpO2: 95% (03 Aug 2017 05:29) (95% - 98%)    PHYSICAL EXAM:    Constitutional: No Acute Distress     Neurological: AOx3, Following Commands, Moving all Extremities  ble 4+/5                                                   Sensation: [x] intact to light touch  [] decreased:     Pulmonary: Clear to Auscultation, No rales, No rhonchi, No wheezes     Cardiovascular: S1, S2, Regular rate and rhythm     Gastrointestinal: Soft, Non-tender, Non-distended     Extremities: No calf tenderness      LABS:            MEDICATIONS:  Anticoagulation:     Antibiotics:    Endo:    Neuro:  levETIRAcetam 250 milliGRAM(s) Oral two times a day    Cardiac:  lisinopril 10 milliGRAM(s) Oral daily    Pulm:    GI/:  docusate sodium 100 milliGRAM(s) Oral three times a day  senna 2 Tablet(s) Oral at bedtime    Other:     DIET: regular     IMAGING: no new imaging

## 2017-08-03 NOTE — DISCHARGE NOTE ADULT - ADDITIONAL INSTRUCTIONS
follow up with Dr. Bales in office in 1 week.  Call office for appointment. [phone]  follow up with primary care doctor.    let attending know if there change in mental status, nausea, vomiting.

## 2017-08-03 NOTE — PHYSICAL THERAPY INITIAL EVALUATION ADULT - GENERAL OBSERVATIONS, REHAB EVAL
Received in bed, alert & verbally responsive, +IV lock, without complaints, agreeable to PT, daughters at bedside

## 2017-08-03 NOTE — DISCHARGE NOTE ADULT - MEDICATION SUMMARY - MEDICATIONS TO TAKE
I will START or STAY ON the medications listed below when I get home from the hospital:    rolling walker  -- DX: gait instability   -- Indication: For gait instability    lisinopril 10 mg oral tablet  -- 1 tab(s) by mouth once a day  -- Indication: For Hypertension    levETIRAcetam 250 mg oral tablet  -- 1 tab(s) by mouth 2 times a day  -- Indication: For Seizure prophylaxis    docusate sodium 100 mg oral capsule  -- 1 cap(s) by mouth 3 times a day  -- Indication: For Stool softner    senna oral tablet  -- 2 tab(s) by mouth once a day (at bedtime)  -- Indication: For Stool softner

## 2017-08-03 NOTE — PHYSICAL THERAPY INITIAL EVALUATION ADULT - DISCHARGE DISPOSITION, PT EVAL
home w/ assist/home w/ home PT As per pt's daughters, they will hire private pay aide 24/7/home w/ home PT/home w/ assist

## 2017-08-03 NOTE — DISCHARGE NOTE ADULT - CARE PLAN
Principal Discharge DX:	SAH (subarachnoid hemorrhage)  Goal:	ct scans stable  Instructions for follow-up, activity and diet:	follow up with Dr. Bales in office in 1 week.  Call office for appointment.  Secondary Diagnosis:	Hypertension  Goal:	continue lisinopril and follow Principal Discharge DX:	SAH (subarachnoid hemorrhage)  Goal:	ct scans stable  Instructions for follow-up, activity and diet:	follow up with Dr. Bales in office in 1 week.  Call office for appointment. [phone]  Secondary Diagnosis:	Hypertension  Goal:	continue lisinopril and follow Principal Discharge DX:	SAH (subarachnoid hemorrhage)  Goal:	ct scans stable  Instructions for follow-up, activity and diet:	follow up with Dr. Bales in office in 1 week.  Call office for appointment. [phone]  Secondary Diagnosis:	Hypertension  Goal:	continue lisinopril and follow Principal Discharge DX:	SAH (subarachnoid hemorrhage)  Goal:	ct scans stable  Instructions for follow-up, activity and diet:	follow up with Dr. Bales in office in 1 week.  Call office for appointment. [phone]  Secondary Diagnosis:	Hypertension  Goal:	continue lisinopril and follow Principal Discharge DX:	SAH (subarachnoid hemorrhage)  Goal:	ct scans stable  Instructions for follow-up, activity and diet:	follow up with Dr. Bales in office in 1 week.  Call office for appointment. [phone]  Secondary Diagnosis:	Hypertension  Goal:	continue lisinopril and follow

## 2017-08-04 PROBLEM — Z00.00 ENCOUNTER FOR PREVENTIVE HEALTH EXAMINATION: Status: ACTIVE | Noted: 2017-08-04

## 2017-08-08 DIAGNOSIS — Z91.81 HISTORY OF FALLING: ICD-10-CM

## 2017-08-08 DIAGNOSIS — R58 HEMORRHAGE, NOT ELSEWHERE CLASSIFIED: ICD-10-CM
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
## 2017-08-08 DIAGNOSIS — S06.5X0A TRAUMATIC SUBDURAL HEMORRHAGE WITHOUT LOSS OF CONSCIOUSNESS, INITIAL ENCOUNTER: ICD-10-CM

## 2017-08-08 DIAGNOSIS — S01.81XA LACERATION WITHOUT FOREIGN BODY OF OTHER PART OF HEAD, INITIAL ENCOUNTER: ICD-10-CM

## 2017-08-08 DIAGNOSIS — R40.2413 GLASGOW COMA SCALE SCORE 13-15, AT HOSPITAL ADMISSION: ICD-10-CM
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
## 2017-08-08 DIAGNOSIS — M19.90 UNSPECIFIED OSTEOARTHRITIS, UNSPECIFIED SITE: ICD-10-CM
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
## 2017-08-08 DIAGNOSIS — S06.6X0A TRAUMATIC SUBARACHNOID HEMORRHAGE WITHOUT LOSS OF CONSCIOUSNESS, INITIAL ENCOUNTER: ICD-10-CM

## 2017-08-08 DIAGNOSIS — I10 ESSENTIAL (PRIMARY) HYPERTENSION: ICD-10-CM
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
## 2017-08-14 ENCOUNTER — APPOINTMENT (OUTPATIENT)
Dept: NEUROSURGERY | Facility: CLINIC | Age: 82
End: 2017-08-14
Payer: MEDICARE

## 2017-08-14 ENCOUNTER — OUTPATIENT (OUTPATIENT)
Dept: OUTPATIENT SERVICES | Facility: HOSPITAL | Age: 82
LOS: 1 days | End: 2017-08-14
Payer: MEDICARE

## 2017-08-14 VITALS
HEART RATE: 68 BPM | WEIGHT: 116 LBS | DIASTOLIC BLOOD PRESSURE: 59 MMHG | HEIGHT: 60 IN | TEMPERATURE: 98 F | OXYGEN SATURATION: 96 % | SYSTOLIC BLOOD PRESSURE: 88 MMHG | BODY MASS INDEX: 22.78 KG/M2

## 2017-08-14 VITALS — SYSTOLIC BLOOD PRESSURE: 86 MMHG | DIASTOLIC BLOOD PRESSURE: 54 MMHG

## 2017-08-14 DIAGNOSIS — I61.9 NONTRAUMATIC INTRACEREBRAL HEMORRHAGE, UNSPECIFIED: ICD-10-CM

## 2017-08-14 DIAGNOSIS — Z78.9 OTHER SPECIFIED HEALTH STATUS: ICD-10-CM

## 2017-08-14 DIAGNOSIS — Z00.8 ENCOUNTER FOR OTHER GENERAL EXAMINATION: ICD-10-CM

## 2017-08-14 DIAGNOSIS — S06.6X9A TRAUMATIC SUBARACHNOID HEMORRHAGE WITH LOSS OF CONSCIOUSNESS OF UNSPECIFIED DURATION, INITIAL ENCOUNTER: ICD-10-CM

## 2017-08-14 DIAGNOSIS — I62.00 NONTRAUMATIC SUBDURAL HEMORRHAGE, UNSPECIFIED: ICD-10-CM

## 2017-08-14 DIAGNOSIS — Z63.4 DISAPPEARANCE AND DEATH OF FAMILY MEMBER: ICD-10-CM

## 2017-08-14 PROCEDURE — 70450 CT HEAD/BRAIN W/O DYE: CPT | Mod: 26

## 2017-08-14 PROCEDURE — 99214 OFFICE O/P EST MOD 30 MIN: CPT

## 2017-08-14 PROCEDURE — 70450 CT HEAD/BRAIN W/O DYE: CPT

## 2017-08-14 RX ORDER — LISINOPRIL 10 MG/1
10 TABLET ORAL DAILY
Refills: 0 | Status: ACTIVE | COMMUNITY

## 2017-08-14 SDOH — SOCIAL STABILITY - SOCIAL INSECURITY: DISSAPEARANCE AND DEATH OF FAMILY MEMBER: Z63.4

## 2017-09-22 ENCOUNTER — OUTPATIENT (OUTPATIENT)
Dept: OUTPATIENT SERVICES | Facility: HOSPITAL | Age: 82
LOS: 1 days | End: 2017-09-22
Payer: MEDICARE

## 2017-09-22 ENCOUNTER — APPOINTMENT (OUTPATIENT)
Dept: NEUROSURGERY | Facility: CLINIC | Age: 82
End: 2017-09-22
Payer: MEDICARE

## 2017-09-22 VITALS
BODY MASS INDEX: 22.78 KG/M2 | WEIGHT: 116 LBS | TEMPERATURE: 98 F | HEART RATE: 85 BPM | SYSTOLIC BLOOD PRESSURE: 153 MMHG | HEIGHT: 60 IN | OXYGEN SATURATION: 97 % | DIASTOLIC BLOOD PRESSURE: 94 MMHG

## 2017-09-22 DIAGNOSIS — I62.00 NONTRAUMATIC SUBDURAL HEMORRHAGE, UNSPECIFIED: ICD-10-CM

## 2017-09-22 DIAGNOSIS — R05 COUGH: ICD-10-CM

## 2017-09-22 DIAGNOSIS — I61.9 NONTRAUMATIC INTRACEREBRAL HEMORRHAGE, UNSPECIFIED: ICD-10-CM

## 2017-09-22 DIAGNOSIS — S06.6X9A TRAUMATIC SUBARACHNOID HEMORRHAGE WITH LOSS OF CONSCIOUSNESS OF UNSPECIFIED DURATION, INITIAL ENCOUNTER: ICD-10-CM

## 2017-09-22 DIAGNOSIS — Z82.61 FAMILY HISTORY OF ARTHRITIS: ICD-10-CM

## 2017-09-22 DIAGNOSIS — Z82.62 FAMILY HISTORY OF OSTEOPOROSIS: ICD-10-CM

## 2017-09-22 PROBLEM — I10 ESSENTIAL (PRIMARY) HYPERTENSION: Chronic | Status: ACTIVE | Noted: 2017-08-01

## 2017-09-22 PROCEDURE — 99213 OFFICE O/P EST LOW 20 MIN: CPT

## 2017-09-22 PROCEDURE — 70450 CT HEAD/BRAIN W/O DYE: CPT

## 2017-09-22 PROCEDURE — 70450 CT HEAD/BRAIN W/O DYE: CPT | Mod: 26

## 2018-01-01 NOTE — PHYSICAL THERAPY INITIAL EVALUATION ADULT - NS ASR RISK AREAS PT EVAL
[place] Discharge Summary      Sadie Markham is a male infant born on 2018 at 6:21 PM. He weighed 4.36 kg and measured 22.047 in length. His head circumference was 35 cm at birth. Apgars were 9  and 9 . He has been doing well. Maternal Data:     Delivery Type: , Low Transverse    Delivery Resuscitation: Suctioning-bulb; Tactile Stimulation  Number of Vessels: 3 Vessels   Cord Events: None  Meconium Stained: None    Estimated Gestational Age: Information for the patient's mother:  Yara Lantigua [[id number]]   40w5d       Prenatal Labs: Information for the patient's mother:  Yara Lantigua [[id number]]     Lab Results   Component Value Date/Time    ABO/Rh(D) A POSITIVE 2018 10:59 AM    Antibody screen NEG 2018 10:59 AM    Antibody screen, External Negative 2017    HBsAg, External Negative 2017    HIV, External Negative 2017    Rubella, External Immune 2017    RPR, External Negative 2017    Gonorrhea, External Negative 2017    Chlamydia, External Negative 2017    GrBStrep, External Negative 2018    ABO,Rh A Positive 2017        Nursery Course: There is no immunization history for the selected administration types on file for this patient. [place] Hearing Screen  Hearing Screen: Yes  Left Ear: Pass  Right Ear: Pass  Repeat Hearing Screen Needed: No    Discharge Exam:     Pulse 120, temperature 98.3 °F (36.8 °C), resp. rate 36, height 0.56 m, weight 4.06 kg, head circumference 35 cm. General: healthy-appearing, vigorous infant. Strong cry.   Head: sutures lines are open,fontanelles soft, flat and open  Eyes: sclerae white,   Ears: well-positioned, well-formed pinnae  Nose: clear, normal mucosa  Mouth: Normal tongue, palate intact,  Neck: normal structure  Chest: lungs clear to auscultation, unlabored breathing, no clavicular crepitus  Heart: RRR, S1 S2, no murmurs  Abd: Soft, non-tender, no masses, no HSM, nondistended, umbilical stump clean and dry  Pulses: strong equal femoral pulses, brisk capillary refill  Hips: Negative Barclay, Ortolani, gluteal creases equal  : Normal genitalia, descended testes  Extremities: well-perfused, warm and dry; extra digit left hand  Neuro: easily aroused  Good symmetric tone and strength  Positive root and suck. Symmetric normal reflexes  Skin: warm and pink      Intake and Output:     07 -  1900  In: 28 [P.O.:35]  Out: -   Urine Occurrence(s): 0 Stool Occurrence(s): 1     Labs:    Recent Results (from the past 96 hour(s))   CORD BLOOD EVALUATION    Collection Time: 18  6:21 PM   Result Value Ref Range    ABO/Rh(D) A Positive Weak D     [place] IgG NEG     WEAK D POS    GLUCOSE, POC    Collection Time: 18  7:45 PM   Result Value Ref Range    Glucose (POC) 47 30 - 60 mg/dL   GLUCOSE, POC    Collection Time: 18  9:10 PM   Result Value Ref Range    Glucose (POC) 56 30 - 60 mg/dL   GLUCOSE, POC    Collection Time: 18 11:51 PM   Result Value Ref Range    Glucose (POC) 48 30 - 60 mg/dL   GLUCOSE, POC    Collection Time: 18  3:17 AM   Result Value Ref Range    Glucose (POC) 58 50 - 90 mg/dL   GLUCOSE, POC    Collection Time: 18  5:47 AM   Result Value Ref Range    Glucose (POC) 60 50 - 90 mg/dL   BILIRUBIN, FRACTIONATED    Collection Time: 18  9:16 PM   Result Value Ref Range    Bilirubin, total 4.3 <8.0 MG/DL    Bilirubin, direct 0.2 <0.21 MG/DL    Bilirubin, indirect 4.1 MG/DL       Feeding method:    Feeding Method: Breast feeding, Pumping    Assessment:     Active Problems:    [place] (2018)     \"Lennox\", 2nd boy  Term 36 week LGA M born via c/s following a pregnancy notable for polyhydramnios, anemia, and Valtrex treatment due to prior HSV infection (no outbreaks during pregnancy). GBS negative. VSS. +v/s. Glucose stable. Planning to breastfeed. Desires circ. Weight down 5%. Transitioning well.   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    Plan:     Continue routine care. Discharge 2018. Consult peds surgery outpatient. Circ done today. Follow-up:  As scheduled.  BFC 1-2 days  Special Instructions: fall

## 2018-01-19 NOTE — PHYSICAL THERAPY INITIAL EVALUATION ADULT - WEIGHT-BEARING RESTRICTIONS: GAIT, REHAB EVAL
I will START or STAY ON the medications listed below when I get home from the hospital:    Motrin  mg oral tablet  -- 1-2 tab(s) by mouth every 6-8 hours as needed for pain.  -- Indication: For Pain med    Tylenol 325 mg oral tablet  -- 2 tab(s) by mouth every 4 hours as needed for pain.  -- Indication: For Pain med
full weight-bearing

## 2019-01-09 NOTE — PATIENT PROFILE ADULT. - CAREGIVER NAME
Basilio [place]     Chief Complaint   Patient presents with    Fever    Chills    Vomiting    Diarrhea     Vitals:    01/09/19 1558   BP: 124/69   Pulse: 85   Resp: 18   Temp: 99.2 °F (37.3 °C)   TempSrc: Oral   SpO2: 98%   Weight: 188 lb (85.3 kg)   Height: 5' 5\" (1.651 m)   PainSc:   8         HPI: Patient is here with her grandma because of chills, low-grade fever at 100.2, abdominal pain vomiting and diarrhea, all started this morning and patient went to school and school nurse called grandma to pick her up. Patient has vomited twice today    , And she had 4 episodes of diarrhea no bloody. No skin rash no severe headaches, she had mild nasal congestion, and mild sore throat    She also needs refill on her Celexa for depression and oral contraceptives. Past Medical History:   Diagnosis Date    Heavy menstrual bleeding     Menstrual cramp      No past surgical history on file. Social History     Tobacco Use    Smoking status: Never Smoker    Smokeless tobacco: Never Used   Substance Use Topics    Alcohol use: Not on file       Family History   Problem Relation Age of Onset    Diabetes Maternal Grandmother     Hypertension Maternal Grandmother     Cancer Maternal Grandmother         breast    Lung Disease Maternal Grandfather     Asthma Maternal Grandfather        Review of Systems   Constitutional: Positive for fever. Negative for chills, malaise/fatigue and weight loss. HENT: Positive for congestion. Negative for ear discharge, ear pain, hearing loss, nosebleeds, sinus pain and sore throat. Eyes: Negative for blurred vision, double vision and discharge. Respiratory: Positive for cough. Negative for hemoptysis, sputum production, shortness of breath and stridor. Cardiovascular: Negative for chest pain, palpitations, claudication and leg swelling. Gastrointestinal: Positive for abdominal pain, diarrhea, nausea and vomiting. Negative for blood in stool, constipation and melena. Genitourinary: Negative for dysuria, flank pain, frequency, hematuria and urgency. Musculoskeletal: Negative for myalgias. Skin: Negative for itching and rash. Neurological: Negative for dizziness, tingling, sensory change, speech change, focal weakness, weakness and headaches. Psychiatric/Behavioral: Negative for depression and suicidal ideas. Physical Exam   Constitutional: She is oriented to person, place, and time. She appears well-developed and well-nourished. No distress. HENT:   Head: Normocephalic and atraumatic. Mouth/Throat: Oropharynx is clear and moist. No oropharyngeal exudate. Eyes: Conjunctivae are normal. Pupils are equal, round, and reactive to light. Right eye exhibits no discharge. Left eye exhibits no discharge. No scleral icterus. Neck: No thyromegaly present. Cardiovascular: Normal rate, regular rhythm and normal heart sounds. No murmur heard. Pulmonary/Chest: Effort normal and breath sounds normal. No respiratory distress. She has no wheezes. She has no rales. She exhibits no tenderness. Abdominal: Soft. She exhibits no distension. There is no tenderness. There is no rebound. Musculoskeletal: Normal range of motion. She exhibits no edema, tenderness or deformity. Lymphadenopathy:     She has no cervical adenopathy. Neurological: She is alert and oriented to person, place, and time. No cranial nerve deficit. Coordination normal.   Skin: Skin is warm and dry. No rash noted. She is not diaphoretic. No erythema. No pallor. Psychiatric: She has a normal mood and affect. Her behavior is normal. Judgment and thought content normal.   Nursing note and vitals reviewed. Assessment and plan     Plan of care has been discussed with the patient, he agrees to the plan and verbalized understanding.   All his questions were answered  More than 50% of the time spent in this visit was counseling the patient about  illness and treatment options     Abdominal pain Vomiting and diarrhea and nausea    URTI    Test is negative for influenza    Given symptomatic treatment with Zofran and Bentyl as needed    Patient only to drink Pedialyte and clear liquids and gradually increase diet. If she is not tolerating oral diet she need to go to emergency room for IV hydration patient and the grandmother verbalized understanding    1. Other depression  Stable  - citalopram (CELEXA) 10 mg tablet; Take 1 Tab by mouth daily. Dispense: 30 Tab; Refill: 2  Continue Celexa and follow-up with psychiatrist and psychologist  2. Dysmenorrhea  . Her menstrual cycle as regular and dysmenorrhea is better  - drospirenone-ethinyl estradiol (MARIA) 3-0.03 mg tab; Take 1 Tab by mouth daily. Dispense: 1 Dose Pack; Refill: 3    3. Menorrhagia with irregular cycle    - drospirenone-ethinyl estradiol (MARIA) 3-0.03 mg tab; Take 1 Tab by mouth daily. Dispense: 1 Dose Pack; Refill: 3    4. Fever, unspecified fever cause    Patient  - AMB POC RAPID INFLUENZA TEST    Current Outpatient Medications   Medication Sig Dispense Refill    citalopram (CELEXA) 10 mg tablet Take 1 Tab by mouth daily. 30 Tab 2    drospirenone-ethinyl estradiol (MARIA) 3-0.03 mg tab Take 1 Tab by mouth daily. 1 Dose Pack 3    ondansetron (ZOFRAN ODT) 8 mg disintegrating tablet Take 1 Tab by mouth every eight (8) hours as needed for Nausea. 10 Tab 0    dicyclomine (BENTYL) 10 mg capsule Take 1 Cap by mouth three (3) times daily. 15 Cap 0    acetaminophen (TYLENOL) 325 mg tablet Take  by mouth every four (4) hours as needed for Pain.  loratadine (CLARITIN) 10 mg tablet Take 1 Tab by mouth daily. 90 Tab 1    fluticasone (FLONASE) 50 mcg/actuation nasal spray One spray each nostril in am and one in school as needed 2 Bottle 3    clotrimazole (CLOTRIM ANTIFUNGAL) 1 % topical cream Apply  to affected area two (2) times a day. 15 g 2    hydrocortisone (HYTONE) 2.5 % topical cream Apply  to affected area two (2) times a day. use thin layer 30 g 2       Patient Active Problem List    Diagnosis Date Noted    Sports physical 01/25/2018    Skin rash 05/22/2015    Bunion 05/22/2015    Abnormal laboratory test result 12/16/2014    Dysmenorrhea 12/02/2014    Ankle sprain 12/02/2014     Results for orders placed or performed in visit on 01/09/19   AMB POC RAPID INFLUENZA TEST   Result Value Ref Range    VALID INTERNAL CONTROL POC Yes     Quick[facility] Influenza test Negative Negative     Office Visit on 01/09/2019   Component Date Value Ref Range Status    VALID INTERNAL CONTROL POC 01/09/2019 Yes   Final    QuickVue Influenza test 01/09/2019 Negative  Negative Final          Follow-up Disposition:  Return if symptoms worsen or fail to improve. see HCP

## 2020-10-08 NOTE — ED ADULT TRIAGE NOTE - BP NONINVASIVE SYSTOLIC (MM HG)
The patient presents with non tender facial lesion. Also recent thumbnail injury   ROS:  General: No fever or sig wt change  HEENT:No other PND eye pain or dc  Respiratory: No cough wheezing  PE: vital signs noted  HEENT: Normocephalic,with no recent trauma,PERRLA,EOMI,conjunctiva injected with no exudate.Nasopharynx is injected and edematous Small AK mid frontal scalp cryo'd    Chest:Clear bilateral breath sounds    Heart:Regular rhthym without murmer  Abdomen:Soft, non tender,no masses, no hepatosplenomegaly  Extremeties Nail deformity trimmed    Impression:AK  Nail injury  Plan:   Rev wound care and symptomatic fu                            150

## 2021-03-09 NOTE — ED ADULT NURSE NOTE - RESPIRATORY RATE (BREATHS/MIN)
18 Minocycline Counseling: Patient advised regarding possible photosensitivity and discoloration of the teeth, skin, lips, tongue and gums.  Patient instructed to avoid sunlight, if possible.  When exposed to sunlight, patients should wear protective clothing, sunglasses, and sunscreen.  The patient was instructed to call the office immediately if the following severe adverse effects occur:  hearing changes, easy bruising/bleeding, severe headache, or vision changes.  The patient verbalized understanding of the proper use and possible adverse effects of minocycline.  All of the patient's questions and concerns were addressed.

## 2022-07-08 NOTE — ED ADULT NURSE NOTE - CHIEF COMPLAINT QUOTE
Most Recent ROBINSON 7 Score       Date ROBINSON 7 Score   7/8/2022 0      trip and fell on ramp, fell backward, laceration, denies any pain, denies LOC

## 2024-09-15 NOTE — DISCHARGE NOTE ADULT - NS AS DC STROKE ED MATERIALS
No
Prescribed Medications/Stroke Education Booklet/Stroke Warning Signs and Symptoms/Call 911 for Stroke/Risk Factors for Stroke/Need for Followup After Discharge

## 2025-02-20 NOTE — ED PROCEDURE NOTE - CPROC ED ANATOMIC LOCATION1
Provider E-Visit time total (minutes): Referred to in person/virtual visit.  Less than 5 minutes.       
occipital aspect of head

## 2025-07-09 NOTE — DISCHARGE NOTE ADULT - NSFTFCONTACT3DT_GEN_ALL_CORE
Medication passed protocol.     Medication: Nystatin passed protocol.   Last office visit date: 3/12/25  Next appointment scheduled?: Yes, patient has appointment scheduled on 7/10/25      03-Aug-2017